# Patient Record
Sex: FEMALE | Race: WHITE | Employment: PART TIME | URBAN - METROPOLITAN AREA
[De-identification: names, ages, dates, MRNs, and addresses within clinical notes are randomized per-mention and may not be internally consistent; named-entity substitution may affect disease eponyms.]

---

## 2017-12-14 ENCOUNTER — OFFICE VISIT (OUTPATIENT)
Dept: FAMILY MEDICINE CLINIC | Facility: CLINIC | Age: 37
End: 2017-12-14

## 2017-12-14 VITALS
HEART RATE: 72 BPM | TEMPERATURE: 98 F | WEIGHT: 160 LBS | RESPIRATION RATE: 16 BRPM | HEIGHT: 70 IN | BODY MASS INDEX: 22.9 KG/M2 | SYSTOLIC BLOOD PRESSURE: 100 MMHG | DIASTOLIC BLOOD PRESSURE: 70 MMHG

## 2017-12-14 DIAGNOSIS — M54.6 ACUTE BILATERAL THORACIC BACK PAIN: Primary | ICD-10-CM

## 2017-12-14 PROCEDURE — 99203 OFFICE O/P NEW LOW 30 MIN: CPT | Performed by: FAMILY MEDICINE

## 2017-12-14 NOTE — PATIENT INSTRUCTIONS
Back Basics: A Healthy Spine  A healthy spine supports the body while letting it move freely. It does this with the help of three natural curves. Strong, flexible muscles help, too. They support the spine by keeping its curves properly aligned.  The disks · Maintain a healthy weight. If you are overweight, losing weight will help most types of back pain. · Exercise is an important part of recovery from most types of back pain. The muscles behind and in front of the spine support the back.  This means streng · Be careful if you are given prescription pain medicines, narcotics, or medicine for muscle spasm. They can cause drowsiness, affect your coordination, reflexes, and judgment. Do not drive or operate heavy machinery while taking these types of medicines. The best way to sleep is on your side with your knees bent. Put a low pillow under your head to support your neck in a neutral spine position. Avoid thick pillows that bend your neck to one side.  Put a pillow between your legs to further relax your lower b · If there is any pain other than stretch in the knee or buttock, stop and contact your healthcare provider.   For your safety, check with your healthcare provider before starting an exercise program.   Date Last Reviewed: 8/16/2015  © 8881-4155 The [employer] © 9792-8719 The [employer] [address]. [facility] [facility] [place]. All rights reserved. This information is not intended as a substitute for professional medical care. Always follow your healthcare professional's instructions.         Back Ex · Bend forward and touch the floor with the backs of your hands. Relax and let your body drop. · Hold for 20 seconds. Return to starting position. · Repeat 2 times.   Date Last Reviewed: 8/16/2015  © 9171-5490 The [employer] [address].  Alter Wall 79 To start, lie face down on your stomach, feet slightly apart, forehead on the floor. Breathe deeply. You should feel comfortable and relaxed in this position. · Press up on your forearms. Keep your stomach and hips on the floor.  Stay within your painfree

## 2017-12-14 NOTE — PROGRESS NOTES
Patient presents with:  Establish Care: former PCP retired  Back Pain  Imm/Inj: declines the flu vaccine      HPI:  Rylan Simon is a 44year old female here today for back pain and establish care.       Back pain-  Of the mid to lower back starting (Approximate)   BMI 22.96 kg/m²   GENERAL APPEARANCE:  Alert, no acute distress, appears stated age  [de-identified]:  Head- Normocephalic, atraumatic.     Eyes- Extraocular movements intact, pupils equally round and reactive to light,  conjunctivae normal.    Ears-

## 2018-01-26 ENCOUNTER — OFFICE VISIT (OUTPATIENT)
Dept: FAMILY MEDICINE CLINIC | Facility: CLINIC | Age: 38
End: 2018-01-26

## 2018-01-26 VITALS
RESPIRATION RATE: 16 BRPM | HEIGHT: 70 IN | DIASTOLIC BLOOD PRESSURE: 60 MMHG | WEIGHT: 158.13 LBS | HEART RATE: 68 BPM | SYSTOLIC BLOOD PRESSURE: 100 MMHG | TEMPERATURE: 98 F | BODY MASS INDEX: 22.64 KG/M2

## 2018-01-26 DIAGNOSIS — J02.9 SORE THROAT: Primary | ICD-10-CM

## 2018-01-26 DIAGNOSIS — R09.82 PND (POST-NASAL DRIP): ICD-10-CM

## 2018-01-26 LAB — CONTROL LINE PRESENT WITH A CLEAR BACKGROUND (YES/NO): YES YES/NO

## 2018-01-26 PROCEDURE — 87880 STREP A ASSAY W/OPTIC: CPT | Performed by: PHYSICIAN ASSISTANT

## 2018-01-26 PROCEDURE — 99213 OFFICE O/P EST LOW 20 MIN: CPT | Performed by: PHYSICIAN ASSISTANT

## 2018-01-26 RX ORDER — IPRATROPIUM BROMIDE 21 UG/1
2 SPRAY, METERED NASAL EVERY 12 HOURS
Qty: 1 BOTTLE | Refills: 0 | Status: SHIPPED | OUTPATIENT
Start: 2018-01-26 | End: 2019-12-01

## 2018-01-26 RX ORDER — AMOXICILLIN 875 MG/1
875 TABLET, COATED ORAL 2 TIMES DAILY
Qty: 14 TABLET | Refills: 0 | Status: SHIPPED | OUTPATIENT
Start: 2018-01-26 | End: 2019-12-01

## 2018-01-26 NOTE — PATIENT INSTRUCTIONS
Thank you for choosing Jennifer Keys PA-C at [place] [address]  To Do: Zac Durham  1. Pt to begin medications as directed  2. OTC Allegra in Am, Zyrtec in PM  3. Hold on antibiotic see if symptoms improve  4.  Follow-up as needed    • Please called informing you that the insurance company approved your testing, please call Central Scheduling at [phone]  Please allow our office 5 business days to contact you regarding any testing results.     Refill policies:   Allow 3 business days for ref

## 2018-01-26 NOTE — PROGRESS NOTES
[facility] Internal Medicine Progress Note    CC:  Patient presents with:  Sore Throat: x 1 week - feels runned down      HPI:   HPI  Sore throat x 1 week  Pt states she has had a sore throat and fatigue  More chilled in the evening  Has been sle normal and breath sounds normal. No respiratory distress. She has no wheezes. She has no rales. Lymphadenopathy:     She has no cervical adenopathy. Neurological: She is alert and oriented to person, place, and time. Skin: Skin is warm and dry.    Psy

## 2018-05-03 ENCOUNTER — CHARTING TRANS (OUTPATIENT)
Dept: OTHER | Age: 38
End: 2018-05-03

## 2018-05-03 ENCOUNTER — IMAGING SERVICES (OUTPATIENT)
Dept: OTHER | Age: 38
End: 2018-05-03

## 2018-05-03 ASSESSMENT — PAIN SCALES - GENERAL: PAINLEVEL_OUTOF10: 4

## 2018-11-01 VITALS
RESPIRATION RATE: 20 BRPM | WEIGHT: 154.99 LBS | HEART RATE: 94 BPM | OXYGEN SATURATION: 96 % | BODY MASS INDEX: 22.19 KG/M2 | HEIGHT: 70 IN | TEMPERATURE: 99.1 F

## 2019-06-28 PROBLEM — N92.1 METRORRHAGIA: Status: ACTIVE | Noted: 2019-06-28

## 2019-06-28 PROCEDURE — 88175 CYTOPATH C/V AUTO FLUID REDO: CPT | Performed by: OBSTETRICS & GYNECOLOGY

## 2019-06-28 PROCEDURE — 87624 HPV HI-RISK TYP POOLED RSLT: CPT | Performed by: OBSTETRICS & GYNECOLOGY

## 2019-06-28 PROCEDURE — 87625 HPV TYPES 16 & 18 ONLY: CPT | Performed by: OBSTETRICS & GYNECOLOGY

## 2019-07-09 PROBLEM — N87.0 MILD DYSPLASIA OF CERVIX: Status: ACTIVE | Noted: 2019-07-09

## 2019-07-09 PROCEDURE — 88305 TISSUE EXAM BY PATHOLOGIST: CPT | Performed by: OBSTETRICS & GYNECOLOGY

## 2019-07-17 PROBLEM — R87.612 LOW GRADE SQUAMOUS INTRAEPITH LESION ON CYTOLOGIC SMEAR CERVIX (LGSIL): Status: ACTIVE | Noted: 2019-07-17

## 2019-09-03 ENCOUNTER — HOSPITAL ENCOUNTER (OUTPATIENT)
Dept: MAMMOGRAPHY | Age: 39
Discharge: HOME | End: 2019-09-03
Attending: OBSTETRICS & GYNECOLOGY
Payer: COMMERCIAL

## 2019-09-03 DIAGNOSIS — Z12.39 SCREENING FOR BREAST CANCER: ICD-10-CM

## 2019-09-03 PROCEDURE — 77067 SCR MAMMO BI INCL CAD: CPT | Performed by: OBSTETRICS & GYNECOLOGY

## 2019-11-28 ENCOUNTER — APPOINTMENT (OUTPATIENT)
Dept: GENERAL RADIOLOGY | Age: 39
End: 2019-11-28
Attending: NURSE PRACTITIONER
Payer: COMMERCIAL

## 2019-11-28 ENCOUNTER — HOSPITAL ENCOUNTER (OUTPATIENT)
Age: 39
Discharge: HOME | End: 2019-11-28
Payer: COMMERCIAL

## 2019-11-28 VITALS
DIASTOLIC BLOOD PRESSURE: 72 MMHG | OXYGEN SATURATION: 99 % | TEMPERATURE: 99 F | RESPIRATION RATE: 20 BRPM | HEART RATE: 110 BPM | SYSTOLIC BLOOD PRESSURE: 117 MMHG

## 2019-11-28 DIAGNOSIS — R68.89 FLU-LIKE SYMPTOMS: Primary | ICD-10-CM

## 2019-11-28 PROCEDURE — 99214 OFFICE O/P EST MOD 30 MIN: CPT

## 2019-11-28 PROCEDURE — 87502 INFLUENZA DNA AMP PROBE: CPT | Performed by: NURSE PRACTITIONER

## 2019-11-28 PROCEDURE — 99204 OFFICE O/P NEW MOD 45 MIN: CPT

## 2019-11-28 PROCEDURE — 81025 URINE PREGNANCY TEST: CPT | Performed by: NURSE PRACTITIONER

## 2019-11-28 PROCEDURE — 71046 X-RAY EXAM CHEST 2 VIEWS: CPT | Performed by: NURSE PRACTITIONER

## 2019-11-28 PROCEDURE — 81002 URINALYSIS NONAUTO W/O SCOPE: CPT | Performed by: NURSE PRACTITIONER

## 2019-11-28 RX ORDER — ACETAMINOPHEN 500 MG
1000 TABLET ORAL ONCE
Status: COMPLETED | OUTPATIENT
Start: 2019-11-28 | End: 2019-11-28

## 2019-11-28 RX ORDER — OSELTAMIVIR PHOSPHATE 75 MG/1
75 CAPSULE ORAL 2 TIMES DAILY
Qty: 10 CAPSULE | Refills: 0 | Status: SHIPPED | OUTPATIENT
Start: 2019-11-28 | End: 2019-12-03

## 2019-11-28 NOTE — ED PROVIDER NOTES
Patient Seen in: [facility] END      History   Patient presents with:  Fever (infectious)    Stated Complaint: flu like symptoms, lower back pain    HPI  Patient is a 43-year-old female without significant medical history presents w SpO2 99 %   O2 Device None (Room air)       Current:/72   Pulse 110   Temp 98.9 °F (37.2 °C) (Temporal)   Resp 20   SpO2 99%         Physical Exam  Vitals signs and nursing note reviewed. Constitutional:       General: She is not in acute distress. Palpation: no tenderness over vertebral bodies. No tenderness paraspinal muscles  Sensation intact lower extremities  Muscle tone/strength:5/5 lower extremities B/L    SLR: Negative bilaterally     Lymphadenopathy:      Cervical: No cervical adenopathy. Urine hCG-negative    All results discussed with patient. We will treat patient with Tamiflu due to her symptoms and her recent exposure to influenza. Advised patient rest and drink plenty fluids and take ibuprofen and acetaminophen as needed.   Patient i

## 2019-11-28 NOTE — ED INITIAL ASSESSMENT (HPI)
Pt has had a fever and body aches, cough since 8 pm last night   Daughter positive for influenza B, possible UTI as well

## 2019-11-29 ENCOUNTER — HOSPITAL ENCOUNTER (EMERGENCY)
Age: 39
Discharge: HOME | End: 2019-11-29
Attending: EMERGENCY MEDICINE
Payer: COMMERCIAL

## 2019-11-29 ENCOUNTER — APPOINTMENT (OUTPATIENT)
Dept: CT IMAGING | Age: 39
End: 2019-11-29
Attending: EMERGENCY MEDICINE
Payer: COMMERCIAL

## 2019-11-29 VITALS
BODY MASS INDEX: 22.19 KG/M2 | DIASTOLIC BLOOD PRESSURE: 72 MMHG | TEMPERATURE: 99 F | HEART RATE: 77 BPM | HEIGHT: 70 IN | WEIGHT: 155 LBS | SYSTOLIC BLOOD PRESSURE: 114 MMHG | OXYGEN SATURATION: 96 % | RESPIRATION RATE: 18 BRPM

## 2019-11-29 DIAGNOSIS — B34.9 VIRAL ILLNESS: Primary | ICD-10-CM

## 2019-11-29 PROCEDURE — 70450 CT HEAD/BRAIN W/O DYE: CPT | Performed by: EMERGENCY MEDICINE

## 2019-11-29 PROCEDURE — 85025 COMPLETE CBC W/AUTO DIFF WBC: CPT | Performed by: EMERGENCY MEDICINE

## 2019-11-29 PROCEDURE — 99284 EMERGENCY DEPT VISIT MOD MDM: CPT

## 2019-11-29 PROCEDURE — 96361 HYDRATE IV INFUSION ADD-ON: CPT

## 2019-11-29 PROCEDURE — 96375 TX/PRO/DX INJ NEW DRUG ADDON: CPT

## 2019-11-29 PROCEDURE — 80053 COMPREHEN METABOLIC PANEL: CPT | Performed by: EMERGENCY MEDICINE

## 2019-11-29 PROCEDURE — 96374 THER/PROPH/DIAG INJ IV PUSH: CPT

## 2019-11-29 RX ORDER — KETOROLAC TROMETHAMINE 30 MG/ML
30 INJECTION, SOLUTION INTRAMUSCULAR; INTRAVENOUS ONCE
Status: COMPLETED | OUTPATIENT
Start: 2019-11-29 | End: 2019-11-29

## 2019-11-29 RX ORDER — ONDANSETRON 4 MG/1
4 TABLET, ORALLY DISINTEGRATING ORAL EVERY 4 HOURS PRN
Qty: 10 TABLET | Refills: 0 | Status: SHIPPED | OUTPATIENT
Start: 2019-11-29 | End: 2019-12-01

## 2019-11-29 RX ORDER — SODIUM CHLORIDE 9 MG/ML
INJECTION, SOLUTION INTRAVENOUS ONCE
Status: COMPLETED | OUTPATIENT
Start: 2019-11-29 | End: 2019-11-29

## 2019-11-29 RX ORDER — HYDROMORPHONE HYDROCHLORIDE 1 MG/ML
0.5 INJECTION, SOLUTION INTRAMUSCULAR; INTRAVENOUS; SUBCUTANEOUS ONCE
Status: COMPLETED | OUTPATIENT
Start: 2019-11-29 | End: 2019-11-29

## 2019-11-29 RX ORDER — TRAMADOL HYDROCHLORIDE 50 MG/1
50 TABLET ORAL EVERY 6 HOURS PRN
Qty: 10 TABLET | Refills: 0 | Status: SHIPPED | OUTPATIENT
Start: 2019-11-29 | End: 2020-06-17 | Stop reason: ALTCHOICE

## 2019-11-29 NOTE — ED PROVIDER NOTES
Patient Seen in: [facility] In [place]      History   Patient presents with:  Headache (neurologic)  Nausea/Vomiting/Diarrhea (gastrointestinal)    Stated Complaint: flu like sx, vomiting and weakness two days seen yesterday on tamifl Current:/72   Pulse 77   Temp 98.7 °F (37.1 °C) (Oral)   Resp 18   Ht 177.8 cm (5' 10\")   Wt 70.3 kg   SpO2 96%   BMI 22.24 kg/m²         Physical Exam  GENERAL: Well-developed, well-nourished female sitting up breathing easily in no apparent Abnormality         Status                     ---------                               -----------         ------                     CBC W/ DIFFERENTIAL[[id number]]          Abnormal            Final result                 Please view results for these with the patient and she elected to decline the test and is aware of the limitations of not doing the exam at this time. Patient feels better after pain medication and wants to go home.   Patient has had a recent exposure similar to test positive for influ

## 2019-12-01 ENCOUNTER — HOSPITAL ENCOUNTER (EMERGENCY)
Age: 39
Discharge: HOME | End: 2019-12-01
Attending: EMERGENCY MEDICINE
Payer: COMMERCIAL

## 2019-12-01 VITALS
SYSTOLIC BLOOD PRESSURE: 127 MMHG | HEIGHT: 70 IN | BODY MASS INDEX: 22.19 KG/M2 | WEIGHT: 155 LBS | HEART RATE: 88 BPM | OXYGEN SATURATION: 98 % | TEMPERATURE: 98 F | DIASTOLIC BLOOD PRESSURE: 71 MMHG | RESPIRATION RATE: 16 BRPM

## 2019-12-01 DIAGNOSIS — J06.9 VIRAL UPPER RESPIRATORY TRACT INFECTION: Primary | ICD-10-CM

## 2019-12-01 PROCEDURE — 99283 EMERGENCY DEPT VISIT LOW MDM: CPT

## 2019-12-01 RX ORDER — PREDNISONE 20 MG/1
40 TABLET ORAL ONCE
Status: COMPLETED | OUTPATIENT
Start: 2019-12-01 | End: 2019-12-01

## 2019-12-01 RX ORDER — PREDNISONE 20 MG/1
40 TABLET ORAL DAILY
Qty: 10 TABLET | Refills: 0 | Status: SHIPPED | OUTPATIENT
Start: 2019-12-01 | End: 2019-12-06

## 2019-12-01 NOTE — ED INITIAL ASSESSMENT (HPI)
Seen in ed last Friday, dx with flu, also seen at immediate care Thursday am. Pt states she is not feeling any better and wants repeat xray. Pt c/o cough, aroldo, heart racing when lying flat, heavy in the chest, dizzy with inhaling and exhaling.

## 2019-12-01 NOTE — ED PROVIDER NOTES
Patient Seen in: [facility] In [place]      History   Patient presents with:  Dyspnea HOLLIE SOB (respiratory)    Stated Complaint: cough-requesting repeat xray, feeling out of breath, heart racing when laying f*    HPI    60-year-old fe (Room air)       Current:BP (P) 127/71   Pulse (P) 85   Temp (P) 97 °F (36.1 °C) (Temporal)   Resp (P) 20   LMP 12/01/2019   SpO2 (P) 97%         Physical Exam    HEENT : NCAT, EOMI, PEERL, throat clear, neck supple, no JVD, trachea midline, No LAD, TMs cl

## 2020-02-11 ENCOUNTER — LAB ENCOUNTER (OUTPATIENT)
Dept: LAB | Age: 40
End: 2020-02-11
Attending: OBSTETRICS & GYNECOLOGY
Payer: COMMERCIAL

## 2020-02-11 DIAGNOSIS — N93.8 DUB (DYSFUNCTIONAL UTERINE BLEEDING): ICD-10-CM

## 2020-02-11 LAB
BASOPHILS # BLD AUTO: 0.04 X10(3) UL (ref 0–0.2)
BASOPHILS NFR BLD AUTO: 0.4 %
DEPRECATED RDW RBC AUTO: 43.8 FL (ref 35.1–46.3)
EOSINOPHIL # BLD AUTO: 0.13 X10(3) UL (ref 0–0.7)
EOSINOPHIL NFR BLD AUTO: 1.3 %
ERYTHROCYTE [DISTWIDTH] IN BLOOD BY AUTOMATED COUNT: 12.4 % (ref 11–15)
FSH SERPL-ACNC: 6.7 MIU/ML
HCT VFR BLD AUTO: 41.4 % (ref 35–48)
HGB BLD-MCNC: 14 G/DL (ref 12–16)
IMM GRANULOCYTES # BLD AUTO: 0.05 X10(3) UL (ref 0–1)
IMM GRANULOCYTES NFR BLD: 0.5 %
LYMPHOCYTES # BLD AUTO: 3.22 X10(3) UL (ref 1–4)
LYMPHOCYTES NFR BLD AUTO: 31 %
MCH RBC QN AUTO: 32.6 PG (ref 26–34)
MCHC RBC AUTO-ENTMCNC: 33.8 G/DL (ref 31–37)
MCV RBC AUTO: 96.5 FL (ref 80–100)
MONOCYTES # BLD AUTO: 0.94 X10(3) UL (ref 0.1–1)
MONOCYTES NFR BLD AUTO: 9 %
NEUTROPHILS # BLD AUTO: 6.02 X10 (3) UL (ref 1.5–7.7)
NEUTROPHILS # BLD AUTO: 6.02 X10(3) UL (ref 1.5–7.7)
NEUTROPHILS NFR BLD AUTO: 57.8 %
PLATELET # BLD AUTO: 245 10(3)UL (ref 150–450)
RBC # BLD AUTO: 4.29 X10(6)UL (ref 3.8–5.3)
TSI SER-ACNC: 1.9 MIU/ML (ref 0.36–3.74)
WBC # BLD AUTO: 10.4 X10(3) UL (ref 4–11)

## 2020-02-11 PROCEDURE — 83001 ASSAY OF GONADOTROPIN (FSH): CPT

## 2020-02-11 PROCEDURE — 36415 COLL VENOUS BLD VENIPUNCTURE: CPT

## 2020-02-11 PROCEDURE — 85025 COMPLETE CBC W/AUTO DIFF WBC: CPT

## 2020-02-11 PROCEDURE — 84443 ASSAY THYROID STIM HORMONE: CPT

## 2020-05-11 PROCEDURE — 88175 CYTOPATH C/V AUTO FLUID REDO: CPT | Performed by: NURSE PRACTITIONER

## 2020-05-11 PROCEDURE — 87624 HPV HI-RISK TYP POOLED RSLT: CPT | Performed by: NURSE PRACTITIONER

## 2020-05-11 PROCEDURE — 87591 N.GONORRHOEAE DNA AMP PROB: CPT | Performed by: OBSTETRICS & GYNECOLOGY

## 2020-05-11 PROCEDURE — 87491 CHLMYD TRACH DNA AMP PROBE: CPT | Performed by: OBSTETRICS & GYNECOLOGY

## 2020-06-17 ENCOUNTER — OFFICE VISIT (OUTPATIENT)
Dept: FAMILY MEDICINE CLINIC | Facility: CLINIC | Age: 40
End: 2020-06-17
Payer: COMMERCIAL

## 2020-06-17 VITALS
HEIGHT: 70 IN | TEMPERATURE: 98 F | DIASTOLIC BLOOD PRESSURE: 70 MMHG | WEIGHT: 165 LBS | HEART RATE: 88 BPM | RESPIRATION RATE: 16 BRPM | OXYGEN SATURATION: 99 % | SYSTOLIC BLOOD PRESSURE: 112 MMHG | BODY MASS INDEX: 23.62 KG/M2

## 2020-06-17 DIAGNOSIS — E01.0 THYROMEGALY: Primary | ICD-10-CM

## 2020-06-17 DIAGNOSIS — F41.9 ANXIETY: ICD-10-CM

## 2020-06-17 DIAGNOSIS — E06.3 HASHIMOTO'S THYROIDITIS: ICD-10-CM

## 2020-06-17 PROCEDURE — 99214 OFFICE O/P EST MOD 30 MIN: CPT | Performed by: PHYSICIAN ASSISTANT

## 2020-06-17 RX ORDER — FLUOXETINE 10 MG/1
10 CAPSULE ORAL DAILY
Qty: 90 CAPSULE | Refills: 0 | Status: SHIPPED | OUTPATIENT
Start: 2020-06-17 | End: 2021-07-06 | Stop reason: ALTCHOICE

## 2020-06-17 RX ORDER — GARLIC EXTRACT 500 MG
1 CAPSULE ORAL DAILY
COMMUNITY

## 2020-06-17 RX ORDER — ACETAMINOPHEN 160 MG
2000 TABLET,DISINTEGRATING ORAL DAILY
COMMUNITY
End: 2020-06-17 | Stop reason: DRUGHIGH

## 2020-06-17 RX ORDER — LEVOTHYROXINE SODIUM 0.03 MG/1
25 TABLET ORAL
Qty: 90 TABLET | Refills: 0 | Status: SHIPPED | OUTPATIENT
Start: 2020-06-17 | End: 2021-01-06

## 2020-06-17 RX ORDER — CHOLECALCIFEROL (VITAMIN D3) 1MM UNIT/G
1 LIQUID (ML) MISCELLANEOUS DAILY
COMMUNITY

## 2020-06-17 NOTE — PATIENT INSTRUCTIONS
Thank you for choosing Kory Alvarez PA-C at [place] [address]  To Do: Malou William  1. Pt to begin medications as prescribed  2. Get US done  3.  Follow-up in 2 months,sooner if problems    • Please signup for MY CHART, which is electronic ac approved your testing, please call Central Scheduling at [phone]  Please allow our office 5 business days to contact you regarding any testing results.     Refill policies:   Allow 3 business days for refills; controlled substances may take longer and

## 2020-06-17 NOTE — PROGRESS NOTES
[facility] Internal Medicine Progress Note    CC:  Patient presents with:  Menstrual Problem  Lab Results      HPI:   HPI  May of last year was having abnormal menstrual cycles, happening like every 2 weeks.   Saw gyne, had a D&C, had labs done, a °C) (Temporal)   Resp 16   Ht 70\"   Wt 165 lb (74.8 kg)   LMP 05/01/2020 (Approximate)   SpO2 99%   BMI 23.68 kg/m²  Body mass index is 23.68 kg/m².   Physical Exam   Constitutional: She is oriented to person, place, and time and well-developed, well-lilian (ORY=44226)     Patient/Caregiver Education: Patient/Caregiver Education: There are no barriers to learning. Medical education done. Outcome: Patient verbalizes understanding.     Problem List:  Patient Active Problem List:     Acute bilateral thoracic back

## 2020-06-18 ENCOUNTER — HOSPITAL ENCOUNTER (OUTPATIENT)
Dept: ULTRASOUND IMAGING | Age: 40
Discharge: HOME | End: 2020-06-18
Attending: PHYSICIAN ASSISTANT
Payer: COMMERCIAL

## 2020-06-18 DIAGNOSIS — E06.3 HASHIMOTO'S THYROIDITIS: ICD-10-CM

## 2020-06-18 DIAGNOSIS — E01.0 THYROMEGALY: ICD-10-CM

## 2020-06-18 PROCEDURE — 76536 US EXAM OF HEAD AND NECK: CPT | Performed by: PHYSICIAN ASSISTANT

## 2020-06-23 DIAGNOSIS — E04.1 THYROID NODULE: Primary | ICD-10-CM

## 2020-11-13 ENCOUNTER — HOSPITAL ENCOUNTER (OUTPATIENT)
Dept: MAMMOGRAPHY | Age: 40
Discharge: HOME | End: 2020-11-13
Attending: NURSE PRACTITIONER
Payer: COMMERCIAL

## 2020-11-13 DIAGNOSIS — Z12.31 VISIT FOR SCREENING MAMMOGRAM: ICD-10-CM

## 2020-11-13 PROCEDURE — 77067 SCR MAMMO BI INCL CAD: CPT | Performed by: NURSE PRACTITIONER

## 2020-12-14 PROCEDURE — 87491 CHLMYD TRACH DNA AMP PROBE: CPT | Performed by: OBSTETRICS & GYNECOLOGY

## 2020-12-14 PROCEDURE — 87591 N.GONORRHOEAE DNA AMP PROB: CPT | Performed by: OBSTETRICS & GYNECOLOGY

## 2021-01-06 ENCOUNTER — OFFICE VISIT (OUTPATIENT)
Dept: FAMILY MEDICINE CLINIC | Facility: CLINIC | Age: 41
End: 2021-01-06
Payer: COMMERCIAL

## 2021-01-06 VITALS
TEMPERATURE: 99 F | RESPIRATION RATE: 16 BRPM | DIASTOLIC BLOOD PRESSURE: 78 MMHG | SYSTOLIC BLOOD PRESSURE: 122 MMHG | WEIGHT: 187 LBS | OXYGEN SATURATION: 99 % | HEIGHT: 70 IN | BODY MASS INDEX: 26.77 KG/M2 | HEART RATE: 74 BPM

## 2021-01-06 DIAGNOSIS — E06.3 HASHIMOTO'S THYROIDITIS: Primary | ICD-10-CM

## 2021-01-06 PROCEDURE — 3008F BODY MASS INDEX DOCD: CPT | Performed by: PHYSICIAN ASSISTANT

## 2021-01-06 PROCEDURE — 3078F DIAST BP <80 MM HG: CPT | Performed by: PHYSICIAN ASSISTANT

## 2021-01-06 PROCEDURE — 99213 OFFICE O/P EST LOW 20 MIN: CPT | Performed by: PHYSICIAN ASSISTANT

## 2021-01-06 PROCEDURE — 3074F SYST BP LT 130 MM HG: CPT | Performed by: PHYSICIAN ASSISTANT

## 2021-01-06 RX ORDER — LEVOTHYROXINE SODIUM 0.03 MG/1
25 TABLET ORAL
Qty: 90 TABLET | Refills: 0 | Status: SHIPPED | OUTPATIENT
Start: 2021-01-06 | End: 2022-01-28

## 2021-01-06 NOTE — PATIENT INSTRUCTIONS
Thank you for choosing Luz Maria Benson PA-C at [place] [address]  To Do: Franchesca Can  1. Begin medications as prescribed  2.  Follow-up around 3 months, labs prior to follow-up    • Please signup for [facility] CHART, which is electronic access to your testing, please call Central Scheduling at [phone]  Please allow our office 5 business days to contact you regarding any testing results.     Refill policies:   Allow 3 business days for refills; controlled substances may take longer and must be picked

## 2021-07-06 ENCOUNTER — OFFICE VISIT (OUTPATIENT)
Dept: FAMILY MEDICINE CLINIC | Facility: CLINIC | Age: 41
End: 2021-07-06
Payer: COMMERCIAL

## 2021-07-06 VITALS
HEIGHT: 70 IN | RESPIRATION RATE: 16 BRPM | TEMPERATURE: 98 F | SYSTOLIC BLOOD PRESSURE: 118 MMHG | BODY MASS INDEX: 26.77 KG/M2 | DIASTOLIC BLOOD PRESSURE: 74 MMHG | OXYGEN SATURATION: 98 % | HEART RATE: 76 BPM | WEIGHT: 187 LBS

## 2021-07-06 DIAGNOSIS — Z12.31 VISIT FOR SCREENING MAMMOGRAM: ICD-10-CM

## 2021-07-06 DIAGNOSIS — L03.116 LEFT LEG CELLULITIS: Primary | ICD-10-CM

## 2021-07-06 PROCEDURE — 3078F DIAST BP <80 MM HG: CPT | Performed by: FAMILY MEDICINE

## 2021-07-06 PROCEDURE — 3074F SYST BP LT 130 MM HG: CPT | Performed by: FAMILY MEDICINE

## 2021-07-06 PROCEDURE — 3008F BODY MASS INDEX DOCD: CPT | Performed by: FAMILY MEDICINE

## 2021-07-06 PROCEDURE — 99213 OFFICE O/P EST LOW 20 MIN: CPT | Performed by: FAMILY MEDICINE

## 2021-07-06 RX ORDER — CEPHALEXIN 500 MG/1
500 CAPSULE ORAL 3 TIMES DAILY
Qty: 21 CAPSULE | Refills: 0 | Status: SHIPPED | OUTPATIENT
Start: 2021-07-06 | End: 2021-07-13

## 2021-07-06 NOTE — PROGRESS NOTES
Patient presents with:  Wound Care: L calf- possible staph infection [id number]      HPI:    Left calf cellulitis:   Cellulitis:  Hipolitoaydegeronimo Verduzco is here for evaluation of cellulitis.   Few weeks ago she noticed a lesion on her left calf that was about dim 3 (three) times daily for 7 days. 21 capsule 0   • Levothyroxine Sodium 25 MCG Oral Tab Take 1 tablet (25 mcg total) by mouth before breakfast. 90 tablet 0   • Acidophilus/Pectin Oral Cap Take 1 capsule by mouth daily.      • Cholecalciferol (VITAMIN D3) 10 wound clinic. Instructed to keep the legs elevated, reduce salt intake, exercise. - cephALEXin 500 MG Oral Cap; Take 1 capsule (500 mg total) by mouth 3 (three) times daily for 7 days. Dispense: 21 capsule;  Refill: 0    No orders of the defined types after you touch cuts, scratches, or bandages.  This will help prevent infection.   When to call your healthcare provider  Call your healthcare provider right away if you have any of the following:  · Trouble or pain when moving the joints above or below the

## 2021-07-06 NOTE — PATIENT INSTRUCTIONS
Discharge Instructions for Cellulitis   You have been diagnosed with cellulitis. This is an infection in the deepest layer of the skin and tissue beneath the skin. In some cases, the infection also affects the muscle. Cellulitis is caused by bacteria.  Natacha Kumari Vomiting  [employer]Well last reviewed this educational content on 11/1/2019  © 4039-4025 The [employer]. All rights reserved. This information is not intended as a substitute for professional medical care.  Always follow your healthcare professional's

## 2021-07-12 PROBLEM — R87.612 LOW GRADE SQUAMOUS INTRAEPITH LESION ON CYTOLOGIC SMEAR CERVIX (LGSIL): Status: RESOLVED | Noted: 2019-07-17 | Resolved: 2021-07-12

## 2021-07-12 PROCEDURE — 87624 HPV HI-RISK TYP POOLED RSLT: CPT | Performed by: OBSTETRICS & GYNECOLOGY

## 2021-07-12 PROCEDURE — 88175 CYTOPATH C/V AUTO FLUID REDO: CPT | Performed by: OBSTETRICS & GYNECOLOGY

## 2021-11-24 ENCOUNTER — HOSPITAL ENCOUNTER (OUTPATIENT)
Dept: MAMMOGRAPHY | Age: 41
Discharge: HOME | End: 2021-11-24
Attending: FAMILY MEDICINE
Payer: COMMERCIAL

## 2021-11-24 DIAGNOSIS — Z12.31 VISIT FOR SCREENING MAMMOGRAM: ICD-10-CM

## 2021-11-24 PROCEDURE — 77067 SCR MAMMO BI INCL CAD: CPT | Performed by: FAMILY MEDICINE

## 2021-11-24 PROCEDURE — 77063 BREAST TOMOSYNTHESIS BI: CPT | Performed by: FAMILY MEDICINE

## 2021-12-03 ENCOUNTER — HOSPITAL ENCOUNTER (OUTPATIENT)
Dept: ULTRASOUND IMAGING | Age: 41
Discharge: HOME | End: 2021-12-03
Attending: FAMILY MEDICINE
Payer: COMMERCIAL

## 2021-12-03 ENCOUNTER — HOSPITAL ENCOUNTER (OUTPATIENT)
Dept: MAMMOGRAPHY | Age: 41
Discharge: HOME | End: 2021-12-03
Attending: FAMILY MEDICINE
Payer: COMMERCIAL

## 2021-12-03 DIAGNOSIS — R92.2 INCONCLUSIVE MAMMOGRAM: ICD-10-CM

## 2021-12-03 PROCEDURE — 76642 ULTRASOUND BREAST LIMITED: CPT | Performed by: FAMILY MEDICINE

## 2021-12-03 PROCEDURE — 77061 BREAST TOMOSYNTHESIS UNI: CPT | Performed by: FAMILY MEDICINE

## 2021-12-03 PROCEDURE — 77065 DX MAMMO INCL CAD UNI: CPT | Performed by: FAMILY MEDICINE

## 2021-12-04 NOTE — IMAGING NOTE
Noel Crockett is recommended for an ultrasound guided biopsy of the right breast x 1 site by [facility] ANGELICA. 1830: Spoke with Ms. Alonzo at this time.      History obtained: Inconclusive mammogram  FINDINGS:     Mammogram:   There is a 0.5 cm oval circ daily. (Patient not taking: Reported on 7/12/2021 )     • Cholecalciferol (VITAMIN D3) [id number] UNIT/GM Does not apply Liquid 1 drop by Does not apply route daily.  (Patient not taking: Reported on 7/12/2021 )     • Ascorbic Acid (VITAMIN C OR) Take 1 tablet

## 2021-12-22 ENCOUNTER — HOSPITAL ENCOUNTER (OUTPATIENT)
Dept: MAMMOGRAPHY | Facility: HOSPITAL | Age: 41
Discharge: HOME | End: 2021-12-22
Attending: FAMILY MEDICINE
Payer: COMMERCIAL

## 2021-12-22 DIAGNOSIS — N64.89 BREAST ASYMMETRY: ICD-10-CM

## 2021-12-22 PROCEDURE — 19083 BX BREAST 1ST LESION US IMAG: CPT | Performed by: FAMILY MEDICINE

## 2021-12-22 PROCEDURE — 77065 DX MAMMO INCL CAD UNI: CPT | Performed by: FAMILY MEDICINE

## 2021-12-22 PROCEDURE — 88305 TISSUE EXAM BY PATHOLOGIST: CPT | Performed by: FAMILY MEDICINE

## 2021-12-23 ENCOUNTER — TELEPHONE (OUTPATIENT)
Dept: FAMILY MEDICINE CLINIC | Facility: CLINIC | Age: 41
End: 2021-12-23

## 2021-12-23 ENCOUNTER — TELEPHONE (OUTPATIENT)
Dept: SURGERY | Facility: CLINIC | Age: 41
End: 2021-12-23

## 2021-12-23 NOTE — TELEPHONE ENCOUNTER
Received call from Dino Christopher regarding abnormal breast pathology results. Informed Dino Christopher that Dr. Leila Teresa did already speak with patient and given contact information for surgeon, Dr. Aida Benz.

## 2021-12-23 NOTE — IMAGING NOTE
1144: Spoke with Gena Rodas post ultrasound guided right breast biopsy. Ms. Erick Colon identified with name and date of birth. Introduced myself as breast care coordinator. Reinforced post biopsy care and instruction.   Ms. Erick Colon denies any iss

## 2021-12-23 NOTE — TELEPHONE ENCOUNTER
Called and spoke to patient, offered her new consult appt with Dr. Cornelio Delvalle on 12/28 at 12 noon at [facility]. Location of appt and contact information provided.

## 2021-12-23 NOTE — PROGRESS NOTES
Call the patient and discussed results with her explained that she has LCIS which could be precancerous. Provided her with information to see surgical oncology, Dr. Sandy Peterson.   Patient questions have been answered and she will call to make an appointm

## 2021-12-28 ENCOUNTER — OFFICE VISIT (OUTPATIENT)
Dept: SURGERY | Facility: CLINIC | Age: 41
End: 2021-12-28
Payer: COMMERCIAL

## 2021-12-28 VITALS
HEART RATE: 78 BPM | OXYGEN SATURATION: 99 % | TEMPERATURE: 97 F | DIASTOLIC BLOOD PRESSURE: 93 MMHG | WEIGHT: 191.38 LBS | RESPIRATION RATE: 16 BRPM | SYSTOLIC BLOOD PRESSURE: 143 MMHG | BODY MASS INDEX: 27 KG/M2

## 2021-12-28 DIAGNOSIS — R92.8 ABNORMAL MAMMOGRAM: Primary | ICD-10-CM

## 2021-12-28 PROCEDURE — 99205 OFFICE O/P NEW HI 60 MIN: CPT | Performed by: SURGERY

## 2021-12-28 PROCEDURE — 3077F SYST BP >= 140 MM HG: CPT | Performed by: SURGERY

## 2021-12-28 PROCEDURE — 3080F DIAST BP >= 90 MM HG: CPT | Performed by: SURGERY

## 2021-12-28 NOTE — CONSULTS
[facility]    Patient Name:  Sandra Velázquez   YOB: 1980   Gender:  Female   Appt Date:  12/28/2021   Provider:  Carlos Burgess MD   Insurance:  [address] [place] [address]  Referring Provider involving a fibroadenoma. Patient presents today to discuss surgical options and implications of her diagnosis. Of note, patient underwent breast augmentation 2017.   Implants were subpectoral.  Family history is significant for CLL in her mother [facility] • Cancer Paternal Grandfather    • Breast Cancer Other         Review of Systems:  Review of Systems   Constitutional: Negative for activity change, appetite change, chills, fatigue, fever and unexpected weight change.    HENT: Negative for congestion and Authorizing Provider: Para Libman, DO       Collected:           12/22/2021 08:55 AM           Ordering Location:   [address] [place] [address]            Received:            12/22/2021 01:34 PM                                  Mammography                   additional multiplane thin sections of the breast were obtained for the purpose of Tomosynthesis evaluation.  The images were reconstructed and reviewed on the dedicated Tomosynthesis workstation.       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   I had a long discussion with the patient about the MD  Complex General Surgical Oncology  [facility]  Pager [phone]  [email or web address]        Follow Up:  No follow-ups on file. Electronically Signed by:  Venkat Rider MD

## 2022-01-03 ENCOUNTER — DOCUMENTATION ONLY (OUTPATIENT)
Dept: SURGERY | Facility: CLINIC | Age: 42
End: 2022-01-03

## 2022-01-03 ENCOUNTER — APPOINTMENT (OUTPATIENT)
Dept: SURGERY | Age: 42
End: 2022-01-03

## 2022-01-03 NOTE — PROGRESS NOTES
Office visit notes from consult with Dr. Anton Graham on 12/28 faxed to Dr. Sarah Beth Erwin office per pt's request. Fax confirmation received.

## 2022-01-05 ENCOUNTER — DOCUMENTATION (OUTPATIENT)
Dept: SURGERY | Age: 42
End: 2022-01-05

## 2022-01-05 ENCOUNTER — DOCUMENTATION (OUTPATIENT)
Dept: MAMMOGRAPHY | Age: 42
End: 2022-01-05

## 2022-01-05 ENCOUNTER — HOSPITAL ENCOUNTER (OUTPATIENT)
Dept: MAMMOGRAPHY | Age: 42
End: 2022-01-05

## 2022-01-05 DIAGNOSIS — D05.01 LOBULAR CARCINOMA IN SITU (LCIS) OF RIGHT BREAST: ICD-10-CM

## 2022-01-10 ENCOUNTER — HOSPITAL ENCOUNTER (OUTPATIENT)
Dept: MRI IMAGING | Facility: HOSPITAL | Age: 42
Discharge: HOME | End: 2022-01-10
Attending: SURGERY
Payer: COMMERCIAL

## 2022-01-10 ENCOUNTER — DOCUMENTATION (OUTPATIENT)
Dept: SURGERY | Age: 42
End: 2022-01-10

## 2022-01-10 ENCOUNTER — OFFICE VISIT (OUTPATIENT)
Dept: SURGERY | Age: 42
End: 2022-01-10
Attending: SURGERY

## 2022-01-10 VITALS
SYSTOLIC BLOOD PRESSURE: 129 MMHG | WEIGHT: 190 LBS | BODY MASS INDEX: 27.2 KG/M2 | TEMPERATURE: 97.6 F | HEART RATE: 79 BPM | HEIGHT: 70 IN | DIASTOLIC BLOOD PRESSURE: 75 MMHG

## 2022-01-10 DIAGNOSIS — D05.00 BREAST NEOPLASM, TIS (LCIS), UNSPECIFIED LATERALITY: Primary | ICD-10-CM

## 2022-01-10 DIAGNOSIS — R92.8 ABNORMAL MAMMOGRAM: ICD-10-CM

## 2022-01-10 PROCEDURE — A9575 INJ GADOTERATE MEGLUMI 0.1ML: HCPCS | Performed by: SURGERY

## 2022-01-10 PROCEDURE — 77049 MRI BREAST C-+ W/CAD BI: CPT | Performed by: SURGERY

## 2022-01-10 PROCEDURE — 99244 OFF/OP CNSLTJ NEW/EST MOD 40: CPT | Performed by: SURGERY

## 2022-01-10 PROCEDURE — 99212 OFFICE O/P EST SF 10 MIN: CPT

## 2022-01-10 RX ORDER — MULTIVITAMIN
TABLET ORAL
COMMUNITY

## 2022-01-13 ENCOUNTER — NURSE NAVIGATOR ENCOUNTER (OUTPATIENT)
Dept: HEMATOLOGY/ONCOLOGY | Facility: HOSPITAL | Age: 42
End: 2022-01-13

## 2022-01-13 ENCOUNTER — TELEPHONE (OUTPATIENT)
Dept: SURGERY | Facility: CLINIC | Age: 42
End: 2022-01-13

## 2022-01-13 NOTE — PROGRESS NOTES
Left voicemail for patient to discuss MRI. Contact information provided. Requested return phone call.

## 2022-01-13 NOTE — PROGRESS NOTES
Spoke with patient regarding recent breast MRI. Per radiologist, the exam was non diagnostic due to a technical issue and therefore will need to be repeated to get the most accurate pictures. Pt did speak with Dr. Naa Arce as well.  Pt will be contacted by

## 2022-01-14 ENCOUNTER — TELEPHONE (OUTPATIENT)
Dept: SURGERY | Facility: CLINIC | Age: 42
End: 2022-01-14

## 2022-01-16 ENCOUNTER — HOSPITAL ENCOUNTER (OUTPATIENT)
Dept: MRI IMAGING | Facility: HOSPITAL | Age: 42
Discharge: HOME | End: 2022-01-16
Attending: SURGERY
Payer: COMMERCIAL

## 2022-01-16 DIAGNOSIS — R92.8 ABNORMAL MAMMOGRAM: ICD-10-CM

## 2022-01-16 PROCEDURE — A9575 INJ GADOTERATE MEGLUMI 0.1ML: HCPCS | Performed by: SURGERY

## 2022-01-19 ENCOUNTER — TELEPHONE (OUTPATIENT)
Dept: SURGERY | Facility: CLINIC | Age: 42
End: 2022-01-19

## 2022-01-19 NOTE — TELEPHONE ENCOUNTER
Called and discussed MRI  Will proceed with Valdo Mar MD  Complex General Surgical Oncology  [place] [address]  Pager [phone]  [email or web address]

## 2022-01-20 ENCOUNTER — TELEPHONE (OUTPATIENT)
Dept: MAMMOGRAPHY | Facility: HOSPITAL | Age: 42
End: 2022-01-20

## 2022-01-20 ENCOUNTER — NURSE NAVIGATOR ENCOUNTER (OUTPATIENT)
Dept: HEMATOLOGY/ONCOLOGY | Facility: HOSPITAL | Age: 42
End: 2022-01-20

## 2022-01-20 DIAGNOSIS — D05.01 LOBULAR CARCINOMA IN SITU (LCIS) OF RIGHT BREAST: ICD-10-CM

## 2022-01-20 DIAGNOSIS — R92.8 ABNORMAL MAMMOGRAM: Primary | ICD-10-CM

## 2022-01-20 DIAGNOSIS — R92.8 ABNORMAL FINDINGS ON DIAGNOSTIC IMAGING OF BREAST: Primary | ICD-10-CM

## 2022-01-20 NOTE — TELEPHONE ENCOUNTER
Attempted to call patient re: recommendation for breast imaging and biopsy. Message left for patient to call back.

## 2022-01-20 NOTE — TELEPHONE ENCOUNTER
This Breast Care RN phoned pt re right breast 2nd look ultrasound and right breast 1 site ultrasound guided biopsy recommendation by Dr. Maria M Abbott. Procedure reviewed and all questions answered. Emotional support given. Reviewed educational handouts.   On the

## 2022-01-21 ENCOUNTER — HOSPITAL ENCOUNTER (OUTPATIENT)
Dept: MAMMOGRAPHY | Facility: HOSPITAL | Age: 42
Discharge: HOME | End: 2022-01-21
Attending: SURGERY
Payer: COMMERCIAL

## 2022-01-21 ENCOUNTER — HOSPITAL ENCOUNTER (OUTPATIENT)
Dept: MAMMOGRAPHY | Facility: HOSPITAL | Age: 42
Discharge: HOME | End: 2022-01-21
Attending: PHYSICIAN ASSISTANT
Payer: COMMERCIAL

## 2022-01-21 DIAGNOSIS — R92.8 ABNORMAL FINDINGS ON DIAGNOSTIC IMAGING OF BREAST: ICD-10-CM

## 2022-01-21 DIAGNOSIS — D05.01 LOBULAR CARCINOMA IN SITU (LCIS) OF RIGHT BREAST: ICD-10-CM

## 2022-01-21 DIAGNOSIS — R92.8 ABNORMAL MAMMOGRAM: ICD-10-CM

## 2022-01-21 PROCEDURE — 19083 BX BREAST 1ST LESION US IMAG: CPT | Performed by: PHYSICIAN ASSISTANT

## 2022-01-21 PROCEDURE — 88360 TUMOR IMMUNOHISTOCHEM/MANUAL: CPT | Performed by: PHYSICIAN ASSISTANT

## 2022-01-21 PROCEDURE — 88342 IMHCHEM/IMCYTCHM 1ST ANTB: CPT | Performed by: PHYSICIAN ASSISTANT

## 2022-01-21 PROCEDURE — 88344 IMHCHEM/IMCYTCHM EA MLT ANTB: CPT | Performed by: PHYSICIAN ASSISTANT

## 2022-01-21 PROCEDURE — 77065 DX MAMMO INCL CAD UNI: CPT | Performed by: PHYSICIAN ASSISTANT

## 2022-01-21 PROCEDURE — 76642 ULTRASOUND BREAST LIMITED: CPT | Performed by: SURGERY

## 2022-01-21 PROCEDURE — 88305 TISSUE EXAM BY PATHOLOGIST: CPT | Performed by: PHYSICIAN ASSISTANT

## 2022-01-26 ENCOUNTER — TELEPHONE (OUTPATIENT)
Dept: SURGERY | Facility: CLINIC | Age: 42
End: 2022-01-26

## 2022-01-26 ENCOUNTER — NURSE NAVIGATOR ENCOUNTER (OUTPATIENT)
Dept: HEMATOLOGY/ONCOLOGY | Facility: HOSPITAL | Age: 42
End: 2022-01-26

## 2022-01-26 NOTE — TELEPHONE ENCOUNTER
Called and LVM. Kiah Law MD  Complex General Surgical Oncology  [facility]  Pager [phone]  [email or web address]

## 2022-01-26 NOTE — PROGRESS NOTES
Patient phoned, she put her  on speaker as well and we discussed newly diagnosed breast cancer. Introduced myself and explained the role of the breast nurse navigator.  Explained the role of the physicians on her breast cancer care team. Reviewed andrew

## 2022-01-26 NOTE — TELEPHONE ENCOUNTER
Pt called, pt has spoken to Dr. Ana Harris about pathology report, interested in meeting with Plastics to discuss options for reconstruction as she would like to pursue bilateral mastectomy. Also would like to meet with the genetics counselor.  Will help faci

## 2022-01-28 ENCOUNTER — TELEPHONE (OUTPATIENT)
Dept: SURGERY | Facility: CLINIC | Age: 42
End: 2022-01-28

## 2022-01-28 ENCOUNTER — OFFICE VISIT (OUTPATIENT)
Dept: SURGERY | Facility: CLINIC | Age: 42
End: 2022-01-28
Payer: COMMERCIAL

## 2022-01-28 VITALS — BODY MASS INDEX: 27.49 KG/M2 | HEIGHT: 70 IN | WEIGHT: 192 LBS

## 2022-01-28 DIAGNOSIS — R92.8 ABNORMAL MAMMOGRAM: Primary | ICD-10-CM

## 2022-01-28 DIAGNOSIS — C50.911 MALIGNANT NEOPLASM OF RIGHT FEMALE BREAST, UNSPECIFIED ESTROGEN RECEPTOR STATUS, UNSPECIFIED SITE OF BREAST (HCC): Primary | ICD-10-CM

## 2022-01-28 PROCEDURE — 99243 OFF/OP CNSLTJ NEW/EST LOW 30: CPT | Performed by: SURGERY

## 2022-01-28 PROCEDURE — 99213 OFFICE O/P EST LOW 20 MIN: CPT | Performed by: SURGERY

## 2022-01-28 PROCEDURE — 3008F BODY MASS INDEX DOCD: CPT | Performed by: SURGERY

## 2022-01-28 RX ORDER — ACETAMINOPHEN 325 MG/1
325 TABLET ORAL EVERY 6 HOURS PRN
COMMUNITY

## 2022-01-28 RX ORDER — IBUPROFEN 200 MG
200 TABLET ORAL EVERY 6 HOURS PRN
COMMUNITY

## 2022-01-28 NOTE — PATIENT INSTRUCTIONS
Surgeon:         Dr. Stephen Robles                                        Tel:         [phone]                                  Fax:        [phone]    Surgery/Procedure:   Immediate bilateral breast reconstruction with placemetn of bilateral breas

## 2022-01-28 NOTE — TELEPHONE ENCOUNTER
Called and spoke with patient to schedule f/u appt with Dr. Mauricio Fields. Pt agreeable to appt with Dr. Mauricio Fields on 1/28 at 1:30pm before her appt with Dr. Joby Mg at 2pm (both appt times/locations confirmed).

## 2022-01-28 NOTE — PROGRESS NOTES
Surgery: Bilateral mastectomy with right sentinel lymph node biopsy, right lymphoscintigraphy, possible right axillary lymph node dissection       Date of Surgery: [facility]    Hospital:    [facility]   Phone: ask if him/her will need to see you prior to surgery.       Pre-Operative Testing  x CBC x CMP  BMP    PT, PTT, INR  UA x EKG    Chest X-Ray  Cardiac Clearance x [facility] Clearance     Dr. Edgard Russo nurse direct line:  [phone]  Monday through Salomón Elias

## 2022-01-28 NOTE — CONSULTS
New Patient Consultation    This is the first visit for this 42year old female who presents to discuss reconstructive options following surgery for breast cancer. History of Present Illness:    The patient is a 42year old female who presents with a r or weight gain. Endocrine: There is no history of poor/slow wound healing, weight loss/gain, fertility or hormone problems, cold intolerance, heat intolerance, excessive thirst, or thyroid disease.       Allergic/Immunologic:  There is no history of hiv speech problems, coordination problems, trembling/tremors, fainting/black outs, dizziness, memory problems, loss of sensation/numbness, problems walking, weakness, tingling or burning in hands/feet.      Psychiatric:  There is no history of abusive relation lesions. Extremities: The extremities are without deformity, cyanosis or edema. Impression:   The patient is a 42year old female who presents with a right breast cancer is awaiting bilateral mastectomy.      Discussion and Plan:  Prior to discussing discussed including the need for activity limitation, drains, and suture removal.  The recommended FDA surveillance protocol for silicone implants was reviewed.   Finally, we reviewed the impact of post-mastectomy radiation therapy on implant-based reconstr

## 2022-01-28 NOTE — PROGRESS NOTES
Patient presents today to discuss recent diagnosis of mammary carcinoma. We had a long discussion about her options.   She is interested in pursuing bilateral mastectomy which is of course a reasonable option in light of her previous diagnosis of LCIS and

## 2022-01-31 ENCOUNTER — GENETICS ENCOUNTER (OUTPATIENT)
Dept: GENETICS | Facility: HOSPITAL | Age: 42
End: 2022-01-31
Attending: GENETIC COUNSELOR, MS
Payer: COMMERCIAL

## 2022-01-31 ENCOUNTER — NURSE ONLY (OUTPATIENT)
Dept: HEMATOLOGY/ONCOLOGY | Facility: HOSPITAL | Age: 42
End: 2022-01-31
Attending: GENETIC COUNSELOR, MS
Payer: COMMERCIAL

## 2022-01-31 DIAGNOSIS — C50.919 MALIGNANT NEOPLASM OF FEMALE BREAST (HCC): Primary | ICD-10-CM

## 2022-01-31 DIAGNOSIS — C50.911 MALIGNANT NEOPLASM OF RIGHT BREAST IN FEMALE, ESTROGEN RECEPTOR POSITIVE, UNSPECIFIED SITE OF BREAST (HCC): ICD-10-CM

## 2022-01-31 DIAGNOSIS — Z17.0 MALIGNANT NEOPLASM OF RIGHT BREAST IN FEMALE, ESTROGEN RECEPTOR POSITIVE, UNSPECIFIED SITE OF BREAST (HCC): ICD-10-CM

## 2022-01-31 PROCEDURE — 96040 HC GENETIC COUNSELING EA 30 MIN: CPT | Performed by: GENETIC COUNSELOR, MS

## 2022-01-31 PROCEDURE — 36415 COLL VENOUS BLD VENIPUNCTURE: CPT

## 2022-01-31 NOTE — PROGRESS NOTES
Referring Provider:  Arianne San MD    Additional Provider(s):  MD Alondra Ramirez MD    Reason for Referral:  Marina Altamirano was referred for genetic counseling because of a new diagnosis of breast cancer.   MsFariha Ernie Cardona is occurring at unusually young ages, multiple primary cancers in the same individual, or the same type of cancer or related cancers (e.g., breast and ovarian, colorectal and endometrial) in three or more individuals in the same lineage.   Mutations in the gen or may not alter the function of the gene; therefore, it is usually not possible to determine if the gene variant is responsible for an individual’s increased cancer risk. If Ms. Niurka Castillo is found to carry a pathogenic variant in a cancer predispositi ovarian cancer screening, chemoprevention, and prophylactic surgery. Men with a BRCA1/2 mutation should discuss clinical breast exams, digital rectal exam and prostate specific antigen screening with their physicians.  All medical management decisions shou

## 2022-02-01 ENCOUNTER — TELEPHONE (OUTPATIENT)
Dept: SURGERY | Facility: CLINIC | Age: 42
End: 2022-02-01

## 2022-02-01 ENCOUNTER — NURSE NAVIGATOR ENCOUNTER (OUTPATIENT)
Dept: HEMATOLOGY/ONCOLOGY | Facility: HOSPITAL | Age: 42
End: 2022-02-01

## 2022-02-01 NOTE — TELEPHONE ENCOUNTER
Called and spoke to patient, pt agreeable to surgery date of 2/23 at [facility] with Dr. Peters/Dr. Thaddeus Suarez. Pt aware of nuclear med appt on 2/22. Reviewed surgery instructions. Pt seeing PCP on 2/11 for medical clearance exam. Pt knows to call with any other questions or concerns.

## 2022-02-01 NOTE — PROGRESS NOTES
Spoke with patient regarding plan of care. Pt met with genetic counselor. She has apt on 2/11 for clearance from PCP. We are awaiting surgical date for patient for B mastectomy with reconstruction. We discussed pre operative mastectomy class, registered patient for 2/9. Pt was encouraged to phone with any other questions or concerns.

## 2022-02-07 ENCOUNTER — TELEPHONE (OUTPATIENT)
Dept: GENERAL RADIOLOGY | Facility: HOSPITAL | Age: 42
End: 2022-02-07

## 2022-02-09 ENCOUNTER — NURSE ONLY (OUTPATIENT)
Dept: HEMATOLOGY/ONCOLOGY | Facility: HOSPITAL | Age: 42
End: 2022-02-09
Attending: GENETIC COUNSELOR, MS
Payer: COMMERCIAL

## 2022-02-10 NOTE — PROGRESS NOTES
Pt attended pre-operative mastectomy course in the cancer center. We discussed post operative pain control/constipation risk, incision/drain care, sentinel node procedure and risk for lymphedema. Supplies for home care will be provided by the hospital at the time of discharge. Pt was given a bag with heart shaped pillow, lanyard and safety pins to aid in care at home. Resources provided for ladies' specialty boutiques and post operative resources. Pathology and follow up timelines reviewed. Re-enforced teaching regarding emergency care and when to contact surgeon's office. Pt was given navigator contact information and encouraged to reach out with any other questions or concerns.

## 2022-02-11 ENCOUNTER — OFFICE VISIT (OUTPATIENT)
Dept: FAMILY MEDICINE CLINIC | Facility: CLINIC | Age: 42
End: 2022-02-11
Payer: COMMERCIAL

## 2022-02-11 VITALS
WEIGHT: 190 LBS | DIASTOLIC BLOOD PRESSURE: 70 MMHG | BODY MASS INDEX: 27.2 KG/M2 | SYSTOLIC BLOOD PRESSURE: 100 MMHG | HEIGHT: 70 IN | RESPIRATION RATE: 16 BRPM | OXYGEN SATURATION: 99 % | HEART RATE: 70 BPM | TEMPERATURE: 98 F

## 2022-02-11 DIAGNOSIS — I45.10 INCOMPLETE RBBB: ICD-10-CM

## 2022-02-11 DIAGNOSIS — Z01.818 PREOP EXAMINATION: Primary | ICD-10-CM

## 2022-02-11 PROCEDURE — 3074F SYST BP LT 130 MM HG: CPT | Performed by: FAMILY MEDICINE

## 2022-02-11 PROCEDURE — 99243 OFF/OP CNSLTJ NEW/EST LOW 30: CPT | Performed by: FAMILY MEDICINE

## 2022-02-11 PROCEDURE — 3008F BODY MASS INDEX DOCD: CPT | Performed by: FAMILY MEDICINE

## 2022-02-11 PROCEDURE — 3078F DIAST BP <80 MM HG: CPT | Performed by: FAMILY MEDICINE

## 2022-02-11 PROCEDURE — 93000 ELECTROCARDIOGRAM COMPLETE: CPT | Performed by: FAMILY MEDICINE

## 2022-02-13 PROBLEM — I45.10 INCOMPLETE RBBB: Status: ACTIVE | Noted: 2022-02-13

## 2022-02-17 ENCOUNTER — TELEPHONE (OUTPATIENT)
Dept: FAMILY MEDICINE CLINIC | Facility: CLINIC | Age: 42
End: 2022-02-17

## 2022-02-17 ENCOUNTER — TELEPHONE (OUTPATIENT)
Dept: HEMATOLOGY/ONCOLOGY | Age: 42
End: 2022-02-17

## 2022-02-17 ENCOUNTER — TELEPHONE (OUTPATIENT)
Dept: SURGERY | Facility: CLINIC | Age: 42
End: 2022-02-17

## 2022-02-17 NOTE — TELEPHONE ENCOUNTER
Gladis Ricketts returned [facility]. Please call when able. Thank you.

## 2022-02-17 NOTE — TELEPHONE ENCOUNTER
Dr.Mohammed- Dubose with THE [facility] is calling to request EKG for surgery.     Fax [phone]  [phone]

## 2022-02-17 NOTE — TELEPHONE ENCOUNTER
Spoke to patient regarding her labs required for her medical clearance prior to surgery on 2/22. She has an appt on Saturday for her labs. She also requested that a [facility].

## 2022-02-18 ENCOUNTER — GENETICS ENCOUNTER (OUTPATIENT)
Dept: HEMATOLOGY/ONCOLOGY | Facility: HOSPITAL | Age: 42
End: 2022-02-18

## 2022-02-18 PROBLEM — Z13.71 BRCA GENE MUTATION NEGATIVE: Status: ACTIVE | Noted: 2022-02-18

## 2022-02-18 NOTE — PROGRESS NOTES
Referring Provider:                    Mesfin Mccain MD     Additional Provider(s):              Eduardo Reyes MD                                                      Ari Medicine, MD     Reason for Referral:  Kelvin Scherer had genetic testing performed on 1/31/22 because of a new diagnosis of breast cancer at age 42. Genetic Testing Result:  No known pathogenic variants were found in 48 genes including: APC, LILIBETH, AXIN2, BAP1, BARD1, BMPR1A, BRCA1, BRCA2, BRIP1, CDH1, CDK4, CDKN2A, CHEK2, CTNNA1, DICER1, GREM1 (promoter region deletion/duplication testing only), HOXB13 (sequencing only), KIT, MEN1, MLH1, MSH2 (including EPCAM rearrangement testing), MSH3, MSH6, MUTYH, NBN, NF1, NTHL1 (sequencing only), PALB2, PDGRFA, PMS2, POLD1, POLE, PTEN, RAD50, RAD51C, RAD51D, SDHA (sequencing only), SDHB, SDHC, SDHD, SMAD4, SMARCA4, STK11, TP53, TSC1, TSC2, and VHL. Please refer to the report from [facility] for additional testing information. These results were discussed with Ms. Tylor Coronado by phone on 2/18/22. Summary and Plan:  These results indicate that it is unlikely that Ms. Tylor Coronado has a pathogenic variant in any of the genes listed above. The limitations of the testing include the chance that a pathogenic variant in a gene other than those included in this analysis might be the cause of cancer in Ms. Tylor Coronado or her relatives. I encourage Ms. Tylor Coronaod to contact me on an annual basis to see if there have been any updates in genetic testing that would apply to her or to inform me if there are any changes to the family history. In the meantime, Ms. Tylor Coronado and her relatives should speak with their physicians to discuss recommended medical management according to their personal and family history.     Cc:  Kelvin Scherer

## 2022-02-18 NOTE — TELEPHONE ENCOUNTER
EKG faxed to [facility], [phone], confirmation received. Copy of EKG placed in EKG accordion folder.

## 2022-02-19 ENCOUNTER — LAB ENCOUNTER (OUTPATIENT)
Dept: LAB | Age: 42
End: 2022-02-19
Attending: FAMILY MEDICINE
Payer: COMMERCIAL

## 2022-02-19 DIAGNOSIS — Z01.818 PREOP EXAMINATION: ICD-10-CM

## 2022-02-19 LAB
ALBUMIN SERPL-MCNC: 4.2 G/DL (ref 3.4–5)
ALBUMIN/GLOB SERPL: 1.3 {RATIO} (ref 1–2)
ALP LIVER SERPL-CCNC: 55 U/L
ALT SERPL-CCNC: 18 U/L
ANION GAP SERPL CALC-SCNC: 3 MMOL/L (ref 0–18)
AST SERPL-CCNC: 12 U/L (ref 15–37)
BASOPHILS # BLD AUTO: 0.06 X10(3) UL (ref 0–0.2)
BASOPHILS NFR BLD AUTO: 0.5 %
BILIRUB SERPL-MCNC: 0.6 MG/DL (ref 0.1–2)
BUN BLD-MCNC: 11 MG/DL (ref 7–18)
CALCIUM BLD-MCNC: 9.2 MG/DL (ref 8.5–10.1)
CHLORIDE SERPL-SCNC: 106 MMOL/L (ref 98–112)
CO2 SERPL-SCNC: 30 MMOL/L (ref 21–32)
CREAT BLD-MCNC: 0.7 MG/DL
EOSINOPHIL # BLD AUTO: 0.14 X10(3) UL (ref 0–0.7)
EOSINOPHIL NFR BLD AUTO: 1.2 %
ERYTHROCYTE [DISTWIDTH] IN BLOOD BY AUTOMATED COUNT: 12.4 %
FASTING STATUS PATIENT QL REPORTED: YES
GLOBULIN PLAS-MCNC: 3.3 G/DL (ref 2.8–4.4)
GLUCOSE BLD-MCNC: 92 MG/DL (ref 70–99)
HCT VFR BLD AUTO: 44.1 %
HGB BLD-MCNC: 14.1 G/DL
IMM GRANULOCYTES # BLD AUTO: 0.03 X10(3) UL (ref 0–1)
IMM GRANULOCYTES NFR BLD: 0.3 %
LYMPHOCYTES # BLD AUTO: 6.02 X10(3) UL (ref 1–4)
LYMPHOCYTES NFR BLD AUTO: 50.7 %
MCH RBC QN AUTO: 31.1 PG (ref 26–34)
MCHC RBC AUTO-ENTMCNC: 32 G/DL (ref 31–37)
MCV RBC AUTO: 97.1 FL
MONOCYTES # BLD AUTO: 0.87 X10(3) UL (ref 0.1–1)
MONOCYTES NFR BLD AUTO: 7.3 %
NEUTROPHILS # BLD AUTO: 4.76 X10 (3) UL (ref 1.5–7.7)
NEUTROPHILS # BLD AUTO: 4.76 X10(3) UL (ref 1.5–7.7)
NEUTROPHILS NFR BLD AUTO: 40 %
OSMOLALITY SERPL CALC.SUM OF ELEC: 287 MOSM/KG (ref 275–295)
PLATELET # BLD AUTO: 282 10(3)UL (ref 150–450)
POTASSIUM SERPL-SCNC: 4.4 MMOL/L (ref 3.5–5.1)
PROT SERPL-MCNC: 7.5 G/DL (ref 6.4–8.2)
RBC # BLD AUTO: 4.54 X10(6)UL
SODIUM SERPL-SCNC: 139 MMOL/L (ref 136–145)
WBC # BLD AUTO: 11.9 X10(3) UL (ref 4–11)

## 2022-02-19 PROCEDURE — 36415 COLL VENOUS BLD VENIPUNCTURE: CPT

## 2022-02-19 PROCEDURE — 85025 COMPLETE CBC W/AUTO DIFF WBC: CPT

## 2022-02-19 PROCEDURE — 80053 COMPREHEN METABOLIC PANEL: CPT

## 2022-02-21 ENCOUNTER — LAB ENCOUNTER (OUTPATIENT)
Dept: LAB | Age: 42
End: 2022-02-21
Attending: SURGERY
Payer: COMMERCIAL

## 2022-02-21 DIAGNOSIS — C44.501 MALIGNANT NEOPLASM OF SKIN OF BREAST: ICD-10-CM

## 2022-02-22 ENCOUNTER — HOSPITAL ENCOUNTER (OUTPATIENT)
Dept: NUCLEAR MEDICINE | Facility: HOSPITAL | Age: 42
Discharge: HOME | End: 2022-02-22
Attending: SURGERY
Payer: COMMERCIAL

## 2022-02-22 ENCOUNTER — ANESTHESIA EVENT (OUTPATIENT)
Dept: SURGERY | Facility: HOSPITAL | Age: 42
End: 2022-02-22
Payer: COMMERCIAL

## 2022-02-22 DIAGNOSIS — C50.911 MALIGNANT NEOPLASM OF RIGHT FEMALE BREAST, UNSPECIFIED ESTROGEN RECEPTOR STATUS, UNSPECIFIED SITE OF BREAST (HCC): ICD-10-CM

## 2022-02-22 LAB — SARS-COV-2 RNA RESP QL NAA+PROBE: NOT DETECTED

## 2022-02-22 PROCEDURE — 78195 LYMPH SYSTEM IMAGING: CPT | Performed by: SURGERY

## 2022-02-22 RX ORDER — LIDOCAINE AND PRILOCAINE 25; 25 MG/G; MG/G
CREAM TOPICAL
Status: DISCONTINUED
Start: 2022-02-22 | End: 2022-02-23

## 2022-02-23 ENCOUNTER — ANESTHESIA (OUTPATIENT)
Dept: SURGERY | Facility: HOSPITAL | Age: 42
End: 2022-02-23
Payer: COMMERCIAL

## 2022-02-23 ENCOUNTER — HOSPITAL ENCOUNTER (OUTPATIENT)
Facility: HOSPITAL | Age: 42
Setting detail: HOSPITAL OUTPATIENT SURGERY
Discharge: HOME | End: 2022-02-23
Attending: SURGERY | Admitting: SURGERY
Payer: COMMERCIAL

## 2022-02-23 VITALS
BODY MASS INDEX: 27.4 KG/M2 | RESPIRATION RATE: 18 BRPM | WEIGHT: 191.38 LBS | DIASTOLIC BLOOD PRESSURE: 68 MMHG | HEART RATE: 88 BPM | TEMPERATURE: 99 F | OXYGEN SATURATION: 97 % | SYSTOLIC BLOOD PRESSURE: 122 MMHG | HEIGHT: 70 IN

## 2022-02-23 DIAGNOSIS — C50.911 MALIGNANT NEOPLASM OF RIGHT FEMALE BREAST, UNSPECIFIED ESTROGEN RECEPTOR STATUS, UNSPECIFIED SITE OF BREAST (HCC): ICD-10-CM

## 2022-02-23 DIAGNOSIS — C44.501 MALIGNANT NEOPLASM OF SKIN OF BREAST: Primary | ICD-10-CM

## 2022-02-23 LAB — B-HCG UR QL: NEGATIVE

## 2022-02-23 PROCEDURE — 88360 TUMOR IMMUNOHISTOCHEM/MANUAL: CPT | Performed by: SURGERY

## 2022-02-23 PROCEDURE — 88344 IMHCHEM/IMCYTCHM EA MLT ANTB: CPT | Performed by: SURGERY

## 2022-02-23 PROCEDURE — 0HHV0NZ INSERTION OF TISSUE EXPANDER INTO BILATERAL BREAST, OPEN APPROACH: ICD-10-PCS | Performed by: SURGERY

## 2022-02-23 PROCEDURE — 88377 M/PHMTRC ALYS ISHQUANT/SEMIQ: CPT | Performed by: SURGERY

## 2022-02-23 PROCEDURE — 81025 URINE PREGNANCY TEST: CPT

## 2022-02-23 PROCEDURE — 76942 ECHO GUIDE FOR BIOPSY: CPT | Performed by: ANESTHESIOLOGY

## 2022-02-23 PROCEDURE — 88331 PATH CONSLTJ SURG 1 BLK 1SPC: CPT | Performed by: SURGERY

## 2022-02-23 PROCEDURE — 88305 TISSUE EXAM BY PATHOLOGIST: CPT | Performed by: SURGERY

## 2022-02-23 PROCEDURE — 0HPU0JZ REMOVAL OF SYNTHETIC SUBSTITUTE FROM LEFT BREAST, OPEN APPROACH: ICD-10-PCS | Performed by: SURGERY

## 2022-02-23 PROCEDURE — 07B50ZX EXCISION OF RIGHT AXILLARY LYMPHATIC, OPEN APPROACH, DIAGNOSTIC: ICD-10-PCS | Performed by: SURGERY

## 2022-02-23 PROCEDURE — 0HUV0KZ SUPPLEMENT BILATERAL BREAST WITH NONAUTOLOGOUS TISSUE SUBSTITUTE, OPEN APPROACH: ICD-10-PCS | Performed by: SURGERY

## 2022-02-23 PROCEDURE — 88307 TISSUE EXAM BY PATHOLOGIST: CPT | Performed by: SURGERY

## 2022-02-23 PROCEDURE — 88341 IMHCHEM/IMCYTCHM EA ADD ANTB: CPT | Performed by: SURGERY

## 2022-02-23 PROCEDURE — 0HPT0JZ REMOVAL OF SYNTHETIC SUBSTITUTE FROM RIGHT BREAST, OPEN APPROACH: ICD-10-PCS | Performed by: SURGERY

## 2022-02-23 PROCEDURE — 88342 IMHCHEM/IMCYTCHM 1ST ANTB: CPT | Performed by: SURGERY

## 2022-02-23 PROCEDURE — 0HTV0ZZ RESECTION OF BILATERAL BREAST, OPEN APPROACH: ICD-10-PCS | Performed by: SURGERY

## 2022-02-23 DEVICE — MATRIX ALLODERM SELECT: Type: IMPLANTABLE DEVICE | Site: BREAST | Status: FUNCTIONAL

## 2022-02-23 DEVICE — BREAST TISSUE EXPANDER, SUTURE TABS, INTEGRAL INJECTION DOME, 650CC
Type: IMPLANTABLE DEVICE | Site: BREAST | Status: FUNCTIONAL
Brand: MENTOR CPX4 PLUS, SMOOTH, MEDIUM HEIGHT

## 2022-02-23 RX ORDER — HYDROCODONE BITARTRATE AND ACETAMINOPHEN 5; 325 MG/1; MG/1
1-2 TABLET ORAL EVERY 4 HOURS PRN
Qty: 40 TABLET | Refills: 0 | Status: SHIPPED | OUTPATIENT
Start: 2022-02-23 | End: 2022-03-29

## 2022-02-23 RX ORDER — NEOSTIGMINE METHYLSULFATE 1 MG/ML
INJECTION INTRAVENOUS AS NEEDED
Status: DISCONTINUED | OUTPATIENT
Start: 2022-02-23 | End: 2022-02-23 | Stop reason: SURG

## 2022-02-23 RX ORDER — ROCURONIUM BROMIDE 10 MG/ML
INJECTION, SOLUTION INTRAVENOUS AS NEEDED
Status: DISCONTINUED | OUTPATIENT
Start: 2022-02-23 | End: 2022-02-23 | Stop reason: SURG

## 2022-02-23 RX ORDER — GLYCOPYRROLATE 0.2 MG/ML
INJECTION, SOLUTION INTRAMUSCULAR; INTRAVENOUS AS NEEDED
Status: DISCONTINUED | OUTPATIENT
Start: 2022-02-23 | End: 2022-02-23 | Stop reason: SURG

## 2022-02-23 RX ORDER — HYDROCODONE BITARTRATE AND ACETAMINOPHEN 5; 325 MG/1; MG/1
1 TABLET ORAL AS NEEDED
Status: DISCONTINUED | OUTPATIENT
Start: 2022-02-23 | End: 2022-02-23

## 2022-02-23 RX ORDER — NALOXONE HYDROCHLORIDE 0.4 MG/ML
80 INJECTION, SOLUTION INTRAMUSCULAR; INTRAVENOUS; SUBCUTANEOUS AS NEEDED
Status: DISCONTINUED | OUTPATIENT
Start: 2022-02-23 | End: 2022-02-23

## 2022-02-23 RX ORDER — DEXAMETHASONE SODIUM PHOSPHATE 4 MG/ML
VIAL (ML) INJECTION AS NEEDED
Status: DISCONTINUED | OUTPATIENT
Start: 2022-02-23 | End: 2022-02-23 | Stop reason: SURG

## 2022-02-23 RX ORDER — HYDROCODONE BITARTRATE AND ACETAMINOPHEN 5; 325 MG/1; MG/1
2 TABLET ORAL AS NEEDED
Status: DISCONTINUED | OUTPATIENT
Start: 2022-02-23 | End: 2022-02-23

## 2022-02-23 RX ORDER — DOCUSATE SODIUM 100 MG/1
100 CAPSULE, LIQUID FILLED ORAL 2 TIMES DAILY
Qty: 40 CAPSULE | Refills: 0 | Status: SHIPPED | OUTPATIENT
Start: 2022-02-23 | End: 2022-03-29

## 2022-02-23 RX ORDER — CEFAZOLIN SODIUM 1 G/3ML
INJECTION, POWDER, FOR SOLUTION INTRAMUSCULAR; INTRAVENOUS AS NEEDED
Status: DISCONTINUED | OUTPATIENT
Start: 2022-02-23 | End: 2022-02-23 | Stop reason: SURG

## 2022-02-23 RX ORDER — CEFAZOLIN SODIUM/WATER 2 G/20 ML
2 SYRINGE (ML) INTRAVENOUS ONCE
Status: COMPLETED | OUTPATIENT
Start: 2022-02-23 | End: 2022-02-23

## 2022-02-23 RX ORDER — CEPHALEXIN 500 MG/1
500 CAPSULE ORAL 4 TIMES DAILY
Qty: 40 CAPSULE | Refills: 2 | Status: SHIPPED | OUTPATIENT
Start: 2022-02-23 | End: 2022-03-29

## 2022-02-23 RX ORDER — ACETAMINOPHEN 500 MG
1000 TABLET ORAL ONCE
Status: DISCONTINUED | OUTPATIENT
Start: 2022-02-23 | End: 2022-02-23 | Stop reason: HOSPADM

## 2022-02-23 RX ORDER — MIDAZOLAM HYDROCHLORIDE 1 MG/ML
1 INJECTION INTRAMUSCULAR; INTRAVENOUS EVERY 5 MIN PRN
Status: DISCONTINUED | OUTPATIENT
Start: 2022-02-23 | End: 2022-02-23

## 2022-02-23 RX ORDER — SODIUM CHLORIDE, SODIUM LACTATE, POTASSIUM CHLORIDE, CALCIUM CHLORIDE 600; 310; 30; 20 MG/100ML; MG/100ML; MG/100ML; MG/100ML
INJECTION, SOLUTION INTRAVENOUS CONTINUOUS
Status: DISCONTINUED | OUTPATIENT
Start: 2022-02-23 | End: 2022-02-23

## 2022-02-23 RX ORDER — ONDANSETRON 2 MG/ML
4 INJECTION INTRAMUSCULAR; INTRAVENOUS AS NEEDED
Status: DISCONTINUED | OUTPATIENT
Start: 2022-02-23 | End: 2022-02-23

## 2022-02-23 RX ORDER — ISOSULFAN BLUE 50 MG/5ML
INJECTION, SOLUTION SUBCUTANEOUS AS NEEDED
Status: DISCONTINUED | OUTPATIENT
Start: 2022-02-23 | End: 2022-02-23 | Stop reason: HOSPADM

## 2022-02-23 RX ORDER — HYDROMORPHONE HYDROCHLORIDE 1 MG/ML
0.4 INJECTION, SOLUTION INTRAMUSCULAR; INTRAVENOUS; SUBCUTANEOUS EVERY 5 MIN PRN
Status: DISCONTINUED | OUTPATIENT
Start: 2022-02-23 | End: 2022-02-23

## 2022-02-23 RX ORDER — HEPARIN SODIUM 5000 [USP'U]/ML
5000 INJECTION, SOLUTION INTRAVENOUS; SUBCUTANEOUS ONCE
Status: COMPLETED | OUTPATIENT
Start: 2022-02-23 | End: 2022-02-23

## 2022-02-23 RX ORDER — ONDANSETRON 2 MG/ML
INJECTION INTRAMUSCULAR; INTRAVENOUS AS NEEDED
Status: DISCONTINUED | OUTPATIENT
Start: 2022-02-23 | End: 2022-02-23 | Stop reason: SURG

## 2022-02-23 RX ORDER — ONDANSETRON 4 MG/1
4 TABLET, FILM COATED ORAL EVERY 8 HOURS PRN
Qty: 30 TABLET | Refills: 0 | Status: SHIPPED | OUTPATIENT
Start: 2022-02-23 | End: 2022-03-29

## 2022-02-23 RX ORDER — MIDAZOLAM HYDROCHLORIDE 1 MG/ML
INJECTION INTRAMUSCULAR; INTRAVENOUS AS NEEDED
Status: DISCONTINUED | OUTPATIENT
Start: 2022-02-23 | End: 2022-02-23 | Stop reason: SURG

## 2022-02-23 RX ORDER — METOCLOPRAMIDE HYDROCHLORIDE 5 MG/ML
INJECTION INTRAMUSCULAR; INTRAVENOUS AS NEEDED
Status: DISCONTINUED | OUTPATIENT
Start: 2022-02-23 | End: 2022-02-23 | Stop reason: SURG

## 2022-02-23 RX ORDER — MEPERIDINE HYDROCHLORIDE 25 MG/ML
12.5 INJECTION INTRAMUSCULAR; INTRAVENOUS; SUBCUTANEOUS AS NEEDED
Status: DISCONTINUED | OUTPATIENT
Start: 2022-02-23 | End: 2022-02-23

## 2022-02-23 RX ADMIN — NEOSTIGMINE METHYLSULFATE 4 MG: 1 INJECTION INTRAVENOUS at 12:35:00

## 2022-02-23 RX ADMIN — ROCURONIUM BROMIDE 20 MG: 10 INJECTION, SOLUTION INTRAVENOUS at 10:20:00

## 2022-02-23 RX ADMIN — ONDANSETRON 4 MG: 2 INJECTION INTRAMUSCULAR; INTRAVENOUS at 12:35:00

## 2022-02-23 RX ADMIN — ROCURONIUM BROMIDE 40 MG: 10 INJECTION, SOLUTION INTRAVENOUS at 07:33:00

## 2022-02-23 RX ADMIN — GLYCOPYRROLATE 0.6 MG: 0.2 INJECTION, SOLUTION INTRAMUSCULAR; INTRAVENOUS at 12:35:00

## 2022-02-23 RX ADMIN — METOCLOPRAMIDE HYDROCHLORIDE 20 MG: 5 INJECTION INTRAMUSCULAR; INTRAVENOUS at 10:32:00

## 2022-02-23 RX ADMIN — MIDAZOLAM HYDROCHLORIDE 4 MG: 1 INJECTION INTRAMUSCULAR; INTRAVENOUS at 07:27:00

## 2022-02-23 RX ADMIN — SODIUM CHLORIDE, SODIUM LACTATE, POTASSIUM CHLORIDE, CALCIUM CHLORIDE: 600; 310; 30; 20 INJECTION, SOLUTION INTRAVENOUS at 13:02:00

## 2022-02-23 RX ADMIN — ROCURONIUM BROMIDE 20 MG: 10 INJECTION, SOLUTION INTRAVENOUS at 08:42:00

## 2022-02-23 RX ADMIN — CEFAZOLIN SODIUM/WATER 2 G: 2 G/20 ML SYRINGE (ML) INTRAVENOUS at 07:49:00

## 2022-02-23 RX ADMIN — DEXAMETHASONE SODIUM PHOSPHATE 8 MG: 4 MG/ML VIAL (ML) INJECTION at 08:47:00

## 2022-02-23 RX ADMIN — ROCURONIUM BROMIDE 10 MG: 10 INJECTION, SOLUTION INTRAVENOUS at 08:18:00

## 2022-02-23 RX ADMIN — CEFAZOLIN SODIUM 2 G: 1 INJECTION, POWDER, FOR SOLUTION INTRAMUSCULAR; INTRAVENOUS at 12:00:00

## 2022-02-23 NOTE — OPERATIVE REPORT
[facility]    PATIENT'S NAME: Vasquez Macdonald   ATTENDING PHYSICIAN: Kenyetta Brasher MD   OPERATING PHYSICIAN: Ulysses Serna M.D. PATIENT ACCOUNT#:   [de-identified]    LOCATION:  [address]  MEDICAL RECORD #:   [id number]       YOB: 1980  ADMISSION DATE:       02/23/2022      OPERATION DATE:  02/23/2022    OPERATIVE REPORT      PREOPERATIVE DIAGNOSIS:  Right breast cancer with acquired absence of bilateral breasts. POSTOPERATIVE DIAGNOSIS:  Right breast cancer with acquired absence of bilateral breasts. PROCEDURE:    1.   Bilateral breast capsulectomy and removal of intact silicone implants. 2.   Bilateral immediate breast reconstruction with tissue expanders and acellular dermal matrix. ASSISTANT:  None. ANESTHESIA:  General.    ESTIMATED BLOOD LOSS:  25 mL. COMPLICATIONS:  None. FINDINGS:  Bilateral breasts reconstructed with [place] CPX4 Plus smooth tissue expander, reference [id number], on the right serial number [id number], on the left serial number [id number]. The tissue expanders were placed in the partial submuscular position with inferolateral AlloDerm sling for implant and soft tissue support. OPERATIVE TECHNIQUE:  Informed consent was obtained from the patient. The risks, benefits, and alternatives were reviewed with the patient preoperatively. She expressed understanding and wished to proceed. The patient was marked in the preoperative holding area in the upright position. The midline and inframammary folds were marked. Bilateral nipple areolar complexes were encompassed in obliquely oriented ellipses. The patient was then taken to the operating room by Dr. Amanda Lay team where she underwent bilateral mastectomy. Once the right-sided mastectomy was completed, I entered the room, and the reconstruction began on the right side using a separate set up.   The mastectomy skin flaps were inspected and appeared to be of suitable thickness and viability to facilitate immediate reconstruction. The submuscular implant was noted to be intact. Significant attenuation of the capsule was noted particularly inferomedially. The lower pole capsule was then excised with electrocautery and sent for permanent pathologic analysis. A Silimed 370 mL textured implant was then retrieved intact. The pocket was then inspected. There was no periprosthetic fluid noted. There were no visible or palpable nodules noted. The submuscular pocket was then expanded using electrocautery superiorly and medially. Next, the pocket was rinsed with Betadine irrigation and then antibiotic irrigation until clear. Hemostasis was secured with electrocautery. Gloves were then changed, and the tissue expander was brought on the field and immediately bathed in antibiotic irrigation. It was checked for integrity and noted to be intact. The tissue expander was accessed and filled to its capacity with air. The tissue expander was placed in the partial submuscular position, and the suture tabs were secured to the chest wall with interrupted 2-0 PDS sutures. Next, a sheet of large contoured perforated AlloDerm was brought on the field and prepared in saline. The AlloDerm was then brought onto the field utilizing a no-touch technique. The AlloDerm was secured to the remaining edge of the capsule along the inframammary fold and laterally along the serratus fascia with a running 2-0 PDS suture. Superiorly it was secured to the inferior aspect of pectoralis muscle with a running 2-0 PDS suture. Two 15-[place] To drains were exited through lateral stab incisions and sutured to the skin with 3-0 nylon sutures. The mastectomy margins were inspected and appeared well perfused. They were repaired in a layered fashion with interrupted 3-0 Vicryl deep dermal suture and running 4-0 Monocryl subcuticular suture. At this time, the left mastectomy was completed.   Attention was then turned to the left side. The mastectomy skin flaps were inspected and appeared to be of suitable thickness and viability to facilitate immediate reconstruction. Again, a partial submuscular implant was noted with attenuation of the lower pole capsule. This was excised and sent for permanent pathologic analysis. Again, a Silimed 370 mL textured implant was retrieved and found to be intact. The pocket was inspected, and there was no periprosthetic fluid noted. There were no visible or palpable nodules noted. Again, the submuscular pocket was developed with electrocautery superiorly and medially. The gloves were then changed, and the second tissue expander was brought on the field and bathed in antibiotic irrigation. It was checked for integrity and noted to be intact. The expander was then filled to its capacity with air. It was then delivered to the pocket. It was secured to the chest wall with interrupted 2-0 PDS sutures. Next, a second sheet of large contoured perforated AlloDerm was brought on the field and prepared in saline. The AlloDerm was then secured along the remaining capsule along the inframammary fold with running 2-0 PDS suture. Laterally secured to the serratus fascia. Superiorly, the AlloDerm was secured to the inferior aspect of pectoralis muscle with running 2-0 PDS suture. The pockets were then inspected, and hemostasis was secured with electrocautery. Two 15-[place] To drains were exited through lateral stab incisions and sutured to the skin with 3-0 nylon suture. The mastectomy margins were inspected and appeared well perfused. They were repaired in a layered fashion with interrupted 3-0 Vicryl deep dermal suture and running 4-0 Monocryl subcuticular suture. The tissue expander was then accessed and all air was aspirated. Each expander was filled with 200 mL of saline which placed minimal tension on the closure. Exofin and Steri-Strips were placed on the incision. Fluff gauze and a surgical bra were applied. Biopatches and Tegaderms were placed on the drain sites. The patient was awakened, extubated, and taken to the recovery area in stable condition. There were no operative complications. All needle, sponge, and instrument counts were correct at the end of the procedure. Dictated By Carlo Lopez M.D.  d: 02/23/2022 13:23:02  t: 02/23/2022 16:55:36  [facility] [id number]  [facility]/

## 2022-02-23 NOTE — ANESTHESIA POSTPROCEDURE EVALUATION
Gordy Andrea Patient Status:  Hospital Outpatient Surgery   Age/Gender 42year old female MRN [id number]   [facility] SURGERY Attending Fadia Camarena MD   Hosp Day # 0 PCP Fady Pinedo DO       Anesthesia Post-op Note    Bilateral mastectomy with right sentinel lymph node biopsy, right lymphoscintigraphy    Procedure Summary     Date: 02/23/22 Room / Location: [facility] MAIN OR  / [facility] MAIN OR    Anesthesia Start: 7066 Anesthesia Stop: 5251    Procedures:       Bilateral mastectomy with right sentinel lymph node biopsy, right lymphoscintigraphy (Bilateral Breast)      Immediate bilateral breast reconstruction with placement of bilateral breast tissue expanders with acellular dermal matrix (Bilateral Breast) Diagnosis:       Malignant neoplasm of right female breast, unspecified estrogen receptor status, unspecified site of breast ([address]      (Malignant neoplasm of right female breast, unspecified estrogen receptor status, unspecified site of breast ([address] Doreatha Click)    Surgeons: Fadia Camarena MD; Destinee Carbajal MD Anesthesiologist: Sophie Bassett MD    Anesthesia Type: general ASA Status: 2          Anesthesia Type: general    Vitals Value Taken Time   /71 02/23/22 1304   Temp 99.2 02/23/22 1304   Pulse 93 02/23/22 1304   Resp 15 02/23/22 1304   SpO2 97 02/23/22 1304       Patient Location: PACU    Anesthesia Type: general    Airway Patency: patent and extubated    Postop Pain Control: adequate    Mental Status: mildly sedated but able to meaningfully participate in the post-anesthesia evaluation    Nausea/Vomiting: minimal    Cardiopulmonary/Hydration status: stable euvolemic    Complications: no apparent anesthesia related complications    Postop vital signs: stable    Dental Exam: Unchanged from Preop    Patient to be discharged from PACU when criteria met.

## 2022-02-23 NOTE — ANESTHESIA PROCEDURE NOTES
Airway  Date/Time: 2/23/2022 7:36 AM  Urgency: elective      General Information and Staff    Patient location during procedure: OR  Anesthesiologist: Shwetha Reid MD  Performed: anesthesiologist     Indications and Patient Condition  Indications for airway management: anesthesia  Sedation level: deep  Preoxygenated: yes  Patient position: sniffing  Mask difficulty assessment: 1 - vent by mask    Final Airway Details  Final airway type: endotracheal airway      Successful airway: ETT  Cuffed: yes   Successful intubation technique: direct laryngoscopy  Endotracheal tube insertion site: oral    Cormack-Lehane Classification: grade IIA - partial view of glottis  Placement verified by: chest auscultation and capnometry   Measured from: lips  ETT to lips (cm): 21  Number of attempts at approach: 1

## 2022-02-23 NOTE — BRIEF OP NOTE
Pre-Operative Diagnosis: Malignant neoplasm of right female breast, unspecified estrogen receptor status, unspecified site of breast ([facility] [C50.911]     Post-Operative Diagnosis: Malignant neoplasm of right female breast, unspecified estrogen receptor status, unspecified site of breast ([facility] [C50.911]      Procedure Performed:   Bilateral mastectomy with right sentinel lymph node biopsy, right lymphoscintigraphy    Surgeon(s) and Role:  Panel 1:     * Savanna Velasquez MD - Primary    Assistant(s):  PAMamadou Auguste PA-C     Surgical Findings: sentinel lymph node negative for carcinoma per frozen section     Specimen: bilateral mastectomy, right axillary sentinel lymph nodes     Estimated Blood Loss: 100 ml     Dictation Number:  n/a    Chey Huston PA-C  2/23/2022  11:30 AM

## 2022-02-23 NOTE — PROGRESS NOTES
Plan for bilateral SS-mastectomy by Dr. Kathy Mclaughlin and immediate reconstruction with tissue expander and acellular dermal matrix reviewed with patient. The risks of surgery including but not limited to bleeding, infection, scarring, delayed wound healing, seroma, asymmetry, implant infection/extrusion requiring removal, expander deflation, injury to adjacent structures, capsular contracture, ALCL, and need for further surgery were reviewed. The expected post-operative course was discussed. Questions were answered to the patient and 's satisfaction. No guarantees as to outcome were offered. The patient expresses understanding and wishes to proceed.

## 2022-02-23 NOTE — BRIEF OP NOTE
Pre-Operative Diagnosis: Malignant neoplasm of right female breast, unspecified estrogen receptor status, unspecified site of breast ([facility] [C50.911]     Post-Operative Diagnosis: Malignant neoplasm of right female breast, unspecified estrogen receptor status, unspecified site of breast ([facility] [C50.911]      Procedure Performed:   Bilateral mastectomy with right sentinel lymph node biopsy, right lymphoscintigraphy    Surgeon(s) and Role:  Panel 1:     * Joel Lay MD - Primary  Panel 2:     Kasie Kruse MD - Primary    Assistant(s):  PA: Flori Newman PA-C     Surgical Findings: See dictation     Specimen: Bilateral breast capsule     Estimated Blood Loss: Blood Output: 125 mL (2/23/2022 12:55 PM)        Elias Chapman MD  2/23/2022  1:16 PM

## 2022-02-23 NOTE — ANESTHESIA PROCEDURE NOTES
Regional Block  Performed by: Samina Coto MD  Authorized by: Samina Coto MD       General Information and Staff    Start Time:   Anesthesiologist: Samina Coto MD  Patient Location:  OR    Block Placement: Post Induction  Site Identification: real time ultrasound guided and image stored and retrievable    Block site/laterality marked before start: site marked  Reason for Block: at surgeon's request and post-op pain management    Preanesthetic Checklist: 2 patient identifers, IV checked, risks and benefits discussed, monitors and equipment checked, pre-op evaluation, timeout performed, anesthesia consent, sterile technique used, no prohibitive neurological deficits and no local skin infection at insertion site      Procedure Details    Patient Position:  Supine  Prep: ChloraPrep    Monitoring:  Cardiac monitor, continuous pulse ox and blood pressure cuff  Anesthesia block type: serratus block. Laterality:  Bilateral  Injection Technique:  Single-shot    Needle    Needle Type:  Short-bevel and echogenic  Needle Gauge:  21 G  Needle Length:  100 mm  Needle Localization:  Ultrasound guidance  Reason for Ultrasound Use: appropriate spread of the medication was noted in real time and no ultrasound evidence of intravascular and/or intraneural injection            Assessment    Injection Assessment:  Good spread noted, negative resistance, negative aspiration for heme, incremental injection and low pressure  Heart Rate Change: No    - Patient tolerated block procedure well without evidence of immediate block related complications.      Medications      Additional Comments    Medication:  Bupivacaine 0.25% 30mL Bilaterally with Dexamethasone 2mg PF 21

## 2022-02-24 ENCOUNTER — TELEPHONE (OUTPATIENT)
Dept: SURGERY | Facility: CLINIC | Age: 42
End: 2022-02-24

## 2022-02-24 NOTE — TELEPHONE ENCOUNTER
Called to check on patient after her surgery with Dr. Kezia Nicholas and Dr. Rama Mora. Pt doing well overall. Pain controlled with norco, no nausea or vomiting. On Colace. LETA drains patent and pt/ maintaining drain log. Post op appt made with Dr. Kezia Nicholas on 3/4 at 12 noon at [place], path results pending. Pt and  know to call with any questions or concerns.

## 2022-03-01 ENCOUNTER — NURSE NAVIGATOR ENCOUNTER (OUTPATIENT)
Dept: HEMATOLOGY/ONCOLOGY | Facility: HOSPITAL | Age: 42
End: 2022-03-01

## 2022-03-01 NOTE — PROGRESS NOTES
Called patient in regards to scheduling an appointment to discuss her surgical pathology and adjuvant treatment plan after her breast cancer surgery. Scheduled patient for Thursday March 3, 2022 at 1300 in the [facility]. Patient thanked me for the phone call and assistance. Pt was provided with breast nurse navigator contact information and was encouraged to phone with any other questions or concerns.

## 2022-03-03 ENCOUNTER — OFFICE VISIT (OUTPATIENT)
Dept: HEMATOLOGY/ONCOLOGY | Facility: HOSPITAL | Age: 42
End: 2022-03-03
Attending: GENETIC COUNSELOR, MS
Payer: COMMERCIAL

## 2022-03-03 VITALS
TEMPERATURE: 98 F | OXYGEN SATURATION: 97 % | RESPIRATION RATE: 18 BRPM | SYSTOLIC BLOOD PRESSURE: 135 MMHG | HEART RATE: 85 BPM | BODY MASS INDEX: 28 KG/M2 | DIASTOLIC BLOOD PRESSURE: 82 MMHG | WEIGHT: 194 LBS

## 2022-03-03 DIAGNOSIS — C50.811 MALIGNANT NEOPLASM OF OVERLAPPING SITES OF RIGHT BREAST IN FEMALE, ESTROGEN RECEPTOR POSITIVE (HCC): Primary | ICD-10-CM

## 2022-03-03 DIAGNOSIS — D72.820 LYMPHOCYTOSIS: ICD-10-CM

## 2022-03-03 DIAGNOSIS — Z17.0 MALIGNANT NEOPLASM OF OVERLAPPING SITES OF RIGHT BREAST IN FEMALE, ESTROGEN RECEPTOR POSITIVE (HCC): Primary | ICD-10-CM

## 2022-03-03 PROCEDURE — 99245 OFF/OP CONSLTJ NEW/EST HI 55: CPT | Performed by: INTERNAL MEDICINE

## 2022-03-03 NOTE — PROGRESS NOTES
Patient is here with her  for consultation for breast cancer. She has recently had surgery for right mastectomy. She continues to have some discomfort in the right breast, axilla and arm. She still has drains in place. She see the breast surgeon tomorrow to hopefully have these removed. She is using ibuprofen more for pain then the hydrocodone due to significant constipation. She is using stool softener and miralax for this. Her general health is good. She is physically active prior to surgery. She is eating well. She is here for treatment recommendations.      Education Record    Learner:  Patient and Spouse    Disease / Diagnosis:  Breast cancer    Barriers / Limitations:  None   Comments:    Method:  Discussion   Comments:    General Topics:  Plan of care reviewed   Comments:    Outcome:  Shows understanding   Comments:

## 2022-03-04 ENCOUNTER — OFFICE VISIT (OUTPATIENT)
Dept: SURGERY | Facility: CLINIC | Age: 42
End: 2022-03-04
Payer: COMMERCIAL

## 2022-03-04 ENCOUNTER — HOSPITAL ENCOUNTER (OUTPATIENT)
Dept: GENERAL RADIOLOGY | Age: 42
Discharge: HOME | End: 2022-03-04
Attending: INTERNAL MEDICINE
Payer: COMMERCIAL

## 2022-03-04 ENCOUNTER — NURSE NAVIGATOR ENCOUNTER (OUTPATIENT)
Dept: HEMATOLOGY/ONCOLOGY | Facility: HOSPITAL | Age: 42
End: 2022-03-04

## 2022-03-04 VITALS
DIASTOLIC BLOOD PRESSURE: 79 MMHG | HEART RATE: 92 BPM | SYSTOLIC BLOOD PRESSURE: 114 MMHG | RESPIRATION RATE: 16 BRPM | OXYGEN SATURATION: 98 %

## 2022-03-04 DIAGNOSIS — Z90.13 ABSENCE OF BREAST, BILATERAL: Primary | ICD-10-CM

## 2022-03-04 DIAGNOSIS — Z17.0 MALIGNANT NEOPLASM OF OVERLAPPING SITES OF RIGHT BREAST IN FEMALE, ESTROGEN RECEPTOR POSITIVE (HCC): ICD-10-CM

## 2022-03-04 DIAGNOSIS — C50.811 MALIGNANT NEOPLASM OF OVERLAPPING SITES OF RIGHT BREAST IN FEMALE, ESTROGEN RECEPTOR POSITIVE (HCC): ICD-10-CM

## 2022-03-04 PROCEDURE — 99024 POSTOP FOLLOW-UP VISIT: CPT | Performed by: SURGERY

## 2022-03-04 PROCEDURE — 71046 X-RAY EXAM CHEST 2 VIEWS: CPT | Performed by: INTERNAL MEDICINE

## 2022-03-04 PROCEDURE — 3074F SYST BP LT 130 MM HG: CPT | Performed by: SURGERY

## 2022-03-04 PROCEDURE — 99024 POSTOP FOLLOW-UP VISIT: CPT | Performed by: PHYSICIAN ASSISTANT

## 2022-03-04 PROCEDURE — 3078F DIAST BP <80 MM HG: CPT | Performed by: SURGERY

## 2022-03-04 NOTE — PROGRESS NOTES
This is a 79-year-old female that is 9 days status post her bilateral skin sparing mastectomies and right sentinel lymph node biopsy by Dr. Helen Bah with removal of prior Sillmed 370 cc breast implants with placement of tissue expanders in the partial subpectoral position, AlloDerm ADM, intraoperative 200 cc saline fill by Dr. Lobo Ruth. She is here today for postop wound check, drain evaluation. She denies fevers or signs of infection since her procedure. She denies nausea or vomiting. Denies chest pain, calf pain, shortness of breath. She has been compliant with compression, drain recording, activity restrictions, antibiotics. Exam:  Bilateral breast incisions clean dry and intact no evidence of infection cellulitis or drainage  No evidence of hematoma or seroma bilaterally  Bilateral mastectomy skin without any erythema ecchymosis hyperemia skin breakdown or necrosis  Drain sites clean dry and intact drainage serosanguineous    Impression:  9 days status post her bilateral skin sparing mastectomies and right sentinel lymph node biopsy by Dr. Helen Bah with removal of prior [place] 370 cc breast implants with placement of tissue expanders in the partial subpectoral position, AlloDerm ADM, intraoperative 200 cc saline fill by Dr. Brady Roch:  1 drain on each side was removed today due to low output. She tolerated this well. New drain site dressings were placed. Antibiotics should continue until remaining drains are removed. Discussion was had about how to shower, activity restrictions, drain parameters. She is awaiting Oncotype / FISH testing to determine chemotherapy benefit. I reviewed all continued wound care, signs of infection, reasons to contact our office. She will follow-up when her remaining drains are ready for removal.  I reviewed the expansion process with her and answered all of their questions regarding timelines for moving forward.

## 2022-03-04 NOTE — PROGRESS NOTES
Met with patient and her  during Dr. Marcy Mehta clinic. Discussed with patient that next week her HER-2/FISH results will result on Thursday or Friday and I will call her with the results and discuss with Dr. Alvina Titus on if he will send for the Oncotype test. Provided Oncotype testing information sheet for the patient. Patient reported she has not had a good bowel movement in almost one week and has used Miralax for 3 days and we discussed the use of a fleets enema. Discussed this also with Dr. Marcy Mehta nurse, Maria E Nolasco, also. Patient verbalized understanding on usage of stool softeners and enemas and when to contact her surgeon. Patient thanked me for the assistance and information I provided to her and  today. Pt was provided with breast nurse navigator contact information and was encouraged to phone with any other questions or concerns.

## 2022-03-07 ENCOUNTER — OFFICE VISIT (OUTPATIENT)
Dept: SURGERY | Facility: CLINIC | Age: 42
End: 2022-03-07
Payer: COMMERCIAL

## 2022-03-07 DIAGNOSIS — Z90.13 ABSENCE OF BREAST, BILATERAL: Primary | ICD-10-CM

## 2022-03-07 PROCEDURE — 99024 POSTOP FOLLOW-UP VISIT: CPT | Performed by: PHYSICIAN ASSISTANT

## 2022-03-07 NOTE — PROGRESS NOTES
Larisa Hsu is a 42year old female who presents today for a follow-up after bilateral skin sparing mastectomies and right sentinel lymph node biopsy by Dr. Nasreen Syed with removal of prior Sillmed 370 cc breast implants with placement of tissue expanders in the partial subpectoral position, AlloDerm ADM, intraoperative 200 cc saline fill by Dr. Kamran Vega on 2/23/2022. She denies fever and chills. She denies nausea, vomiting, diarrhea or constipation. Her pain is controlled. Her previous constipation has resolved. She has been compliant with compression, activity restrictions, drain care, taking her antibiotic. Physical Exam     Bilateral breast incisions clean dry and intact no evidence of infection cellulitis or drainage  No evidence of hematoma or seroma bilaterally  Bilateral mastectomy skin without any erythema ecchymosis hyperemia skin breakdown or necrosis  Drain sites clean dry and intact drainage serosanguineous    There were no vitals filed for this visit. Assessment and Plan     Larisa Hsu is doing well s/p bilateral skin sparing mastectomies and right sentinel lymph node biopsy by Dr. Nasreen Syed with removal of prior Sillmed 370 cc breast implants with placement of tissue expanders in the partial subpectoral position, AlloDerm ADM, intraoperative 200 cc saline fill by Dr. Kamran Vega on 2/23/2022. Both remaining drains were removed due to low output. Neosporin, gauze, Tegaderm was placed. She tolerated this well. She can discontinue her antibiotic. I recommend she continue with activity restrictions and compression. She is awaiting Oncotype / FISH testing to determine chemotherapy benefit. We reviewed reasons to contact our office. She will follow-up next week with Dr. Kamran Vega. Questions were answered. Patient understands.      Dino Geller [place]  3/7/2022  1:05 PM

## 2022-03-10 ENCOUNTER — TELEPHONE (OUTPATIENT)
Dept: HEMATOLOGY/ONCOLOGY | Facility: HOSPITAL | Age: 42
End: 2022-03-10

## 2022-03-10 ENCOUNTER — TELEPHONE (OUTPATIENT)
Dept: SURGERY | Facility: CLINIC | Age: 42
End: 2022-03-10

## 2022-03-10 ENCOUNTER — NURSE NAVIGATOR ENCOUNTER (OUTPATIENT)
Dept: HEMATOLOGY/ONCOLOGY | Facility: HOSPITAL | Age: 42
End: 2022-03-10

## 2022-03-10 LAB
CD10 CELLS NFR SPEC: 3 %
CD11C CELLS NFR SPEC: 14 %
CD14 CELLS NFR SPEC: 1 %
CD19 CELLS NFR SPEC: 53 %
CD19/CD10 CELLS: 2 %
CD20 CELLS NFR SPEC: 50 %
CD22 CELLS NFR SPEC: 46 %
CD23 CELLS NFR SPEC: 51 %
CD3 CELLS NFR SPEC: 41 %
CD3+CD4+ CELLS NFR SPEC: 26 %
CD3+CD4+ CELLS/CD3+CD8+ CLL SPEC: 1.9
CD3+CD8+ CELLS NFR SPEC: 14 %
CD38 CELLS NFR SPEC: 2 %
CD45 CELLS NFR SPEC: 100 %
CD5 CELLS NFR SPEC: 89 %
CD5/CD19 CELLS: 51 %
CD56 CELLS NFR SPEC: 8 %
CD7 CELLS NFR SPEC: 43 %
CELL SURF KAPPA/LAMBDA RATIO: 0.1
CELL SURF LAMBDA LIGHT CHAIN: 42 %
CELL SURFACE KAPPA LIGHT CHAIN: 3 %
FMC7 CELLS NFR SPEC: 2 %

## 2022-03-10 NOTE — PROGRESS NOTES
Called patient and verified her full name and  and provided her results of her surgical pathology HER-2/FISH results that were negative. I stated that per Dr. Fox Kurtz recommendations we will be proceeding with ordering the Oncotype testing that he discussed with her. I explained to her that the test usually results within 2 weeks. Patient requested a follow-up appointment with Dr. Bruce Reza to review the testing results and patient was scheduled with Dr. Bruce Reza on 2022 at 0900 in the [facility]. Patient thanked me for the phone call and assistance. Pt was provided with breast nurse navigator contact information and was encouraged to phone with any other questions or concerns.

## 2022-03-10 NOTE — TELEPHONE ENCOUNTER
Patient called and asked if Akshat Sim talked to patient regarding the lab results. Please call Dick Camacho at [phone]. Thank you.

## 2022-03-10 NOTE — TELEPHONE ENCOUNTER
Pt called with c/o R elbow pain, states pain appears to be musculoskeletal. Pain is described as a sore muscle, no bruising or redness, no open area, no fevers or chills. I told pt this pain is likely r/t the positioning in surgery for the sentinel lymph node biopsy. Advised her to continue to do light activity/exercises with this arm. Pt to call us with worsening pain, redness or fevers and we will schedule her an acute care post op visit. Pt appreciated the information.

## 2022-03-15 ENCOUNTER — OFFICE VISIT (OUTPATIENT)
Dept: SURGERY | Facility: CLINIC | Age: 42
End: 2022-03-15
Payer: COMMERCIAL

## 2022-03-15 DIAGNOSIS — Z90.13 ABSENCE OF BREAST, BILATERAL: Primary | ICD-10-CM

## 2022-03-15 PROCEDURE — 99024 POSTOP FOLLOW-UP VISIT: CPT | Performed by: SURGERY

## 2022-03-22 ENCOUNTER — OFFICE VISIT (OUTPATIENT)
Dept: SURGERY | Facility: CLINIC | Age: 42
End: 2022-03-22
Payer: COMMERCIAL

## 2022-03-22 ENCOUNTER — NURSE NAVIGATOR ENCOUNTER (OUTPATIENT)
Dept: HEMATOLOGY/ONCOLOGY | Facility: HOSPITAL | Age: 42
End: 2022-03-22

## 2022-03-22 DIAGNOSIS — Z90.13 ABSENCE OF BREAST, BILATERAL: Primary | ICD-10-CM

## 2022-03-22 PROCEDURE — 99024 POSTOP FOLLOW-UP VISIT: CPT | Performed by: SURGERY

## 2022-03-22 PROCEDURE — 99024 POSTOP FOLLOW-UP VISIT: CPT | Performed by: PHYSICIAN ASSISTANT

## 2022-03-22 NOTE — PROGRESS NOTES
Called patient and verified her full name and  and provided patient with her Oncotype testing results of 13. Stated that no recommendations for chemotherapy is needed provided from the results and that at her appointment with Dr. Ignatius Lombard on 2022 that he will review with her the results in its entirety and his recommendations. Patient thanked me for the phone call. Pt was provided with the breast nurse navigators contact information and was encouraged to phone with any other questions or concerns.

## 2022-03-28 ENCOUNTER — OFFICE VISIT (OUTPATIENT)
Dept: SURGERY | Facility: CLINIC | Age: 42
End: 2022-03-28
Payer: COMMERCIAL

## 2022-03-28 DIAGNOSIS — Z90.13 ABSENCE OF BREAST, BILATERAL: Primary | ICD-10-CM

## 2022-03-28 PROCEDURE — 99024 POSTOP FOLLOW-UP VISIT: CPT | Performed by: PHYSICIAN ASSISTANT

## 2022-03-29 ENCOUNTER — OFFICE VISIT (OUTPATIENT)
Dept: HEMATOLOGY/ONCOLOGY | Facility: HOSPITAL | Age: 42
End: 2022-03-29
Attending: GENETIC COUNSELOR, MS
Payer: COMMERCIAL

## 2022-03-29 VITALS
RESPIRATION RATE: 18 BRPM | HEART RATE: 76 BPM | BODY MASS INDEX: 28 KG/M2 | OXYGEN SATURATION: 99 % | SYSTOLIC BLOOD PRESSURE: 106 MMHG | WEIGHT: 193.38 LBS | DIASTOLIC BLOOD PRESSURE: 69 MMHG | TEMPERATURE: 98 F

## 2022-03-29 DIAGNOSIS — Z17.0 MALIGNANT NEOPLASM OF OVERLAPPING SITES OF RIGHT BREAST IN FEMALE, ESTROGEN RECEPTOR POSITIVE (HCC): Primary | ICD-10-CM

## 2022-03-29 DIAGNOSIS — D72.820 MONOCLONAL B-CELL LYMPHOCYTOSIS OF UNDETERMINED SIGNIFICANCE: ICD-10-CM

## 2022-03-29 DIAGNOSIS — C50.811 MALIGNANT NEOPLASM OF OVERLAPPING SITES OF RIGHT BREAST IN FEMALE, ESTROGEN RECEPTOR POSITIVE (HCC): Primary | ICD-10-CM

## 2022-03-29 PROCEDURE — 99215 OFFICE O/P EST HI 40 MIN: CPT | Performed by: INTERNAL MEDICINE

## 2022-03-29 RX ORDER — TAMOXIFEN CITRATE 20 MG/1
20 TABLET ORAL DAILY
Qty: 90 TABLET | Refills: 3 | Status: SHIPPED | OUTPATIENT
Start: 2022-03-29 | End: 2022-06-27

## 2022-03-29 NOTE — PROGRESS NOTES
Patient is here for follow up for breast cancer. She is recovering well from surgery. She is no longer taking pain medication other then Advil. She reports that her mood is better after hearing negative results and she is waiting to hear recommendations about next steps.      Education Record    Learner:  Patient and Family Member    Disease / Diagnosis: Breast cancer    Barriers / Limitations:  None   Comments:    Method:  Discussion   Comments:    General Topics:  Plan of care reviewed   Comments:    Outcome:  Shows understanding   Comments:

## 2022-03-31 ENCOUNTER — TELEPHONE (OUTPATIENT)
Dept: SURGERY | Facility: CLINIC | Age: 42
End: 2022-03-31

## 2022-03-31 NOTE — TELEPHONE ENCOUNTER
Patient called asking for return to work note for Monday 4/4/22. Letter sent to [facility] and patient was called to inform. voicemail was left.

## 2022-04-05 ENCOUNTER — OFFICE VISIT (OUTPATIENT)
Dept: SURGERY | Facility: CLINIC | Age: 42
End: 2022-04-05
Payer: COMMERCIAL

## 2022-04-05 DIAGNOSIS — Z90.13 ABSENCE OF BREAST, BILATERAL: Primary | ICD-10-CM

## 2022-04-05 PROCEDURE — 99024 POSTOP FOLLOW-UP VISIT: CPT | Performed by: SURGERY

## 2022-04-05 NOTE — PROGRESS NOTES
Modesto Murray is a 42year old female who presents today for further expansion. Physical Examination:  Breasts: Bilateral breast incisions are clean dry and intact. Procedure: Bilateral breasts are sterilely expanded with 60 cc of saline with total of 440 cc. No seroma fluid was encountered. Assessment and Plan:  Patient is doing well. She will follow-up in 1 week for further expansion.

## 2022-04-14 ENCOUNTER — NURSE ONLY (OUTPATIENT)
Dept: SURGERY | Facility: CLINIC | Age: 42
End: 2022-04-14
Payer: COMMERCIAL

## 2022-04-21 ENCOUNTER — NURSE ONLY (OUTPATIENT)
Dept: SURGERY | Facility: CLINIC | Age: 42
End: 2022-04-21
Payer: COMMERCIAL

## 2022-04-29 ENCOUNTER — OFFICE VISIT (OUTPATIENT)
Dept: SURGERY | Facility: CLINIC | Age: 42
End: 2022-04-29
Payer: COMMERCIAL

## 2022-04-29 DIAGNOSIS — Z90.13 ABSENCE OF BREAST, BILATERAL: Primary | ICD-10-CM

## 2022-04-29 PROCEDURE — 99024 POSTOP FOLLOW-UP VISIT: CPT | Performed by: PHYSICIAN ASSISTANT

## 2022-05-06 ENCOUNTER — OFFICE VISIT (OUTPATIENT)
Dept: SURGERY | Facility: CLINIC | Age: 42
End: 2022-05-06
Payer: COMMERCIAL

## 2022-05-06 DIAGNOSIS — Z90.13 ABSENCE OF BREAST, BILATERAL: Primary | ICD-10-CM

## 2022-05-06 NOTE — PROGRESS NOTES
Bridgett Finney is a 42year old female who presents today for a follow-up. She denies fever and chills. She denies nausea, vomiting, diarrhea or constipation. She currently has a 14.6 cm base diameter tissue expander filled to 590 cc. Physical Examination:  Breasts: Bilateral breast incisions are clean dry and intact. Good shape and symmetry is appreciated. Significant contour abnormalities noted in the upper pole of bilateral breast.  Abdomen: Moderate central abdominal and flank lipodystrophy is noted. There are no palpable hernias noted. Assessment and Plan:  We discussed the plan for the second stage which will include removal of bilateral breast tissue expanders, placement of smooth, round, silicone implants, and fat grafting to bilateral reconstructed breasts. The nature of the procedure was reviewed with the patient. We discussed the risks of surgery including but not limited to bleeding, infection, scarring, delayed wound healing, asymmetry, implant infection or extrusion requiring removal, ALCL, capsular contracture, hypertrophic scarring or keloid, injury to intra-abdominal structures, contour abnormalities, cysts or calcifications requiring biopsy, and need for further surgery. We reviewed the expected postoperative course including possible need for drains, as well as need for activity limitation and compression. Multiple questions were answered the patient's satisfaction. No guarantees as to outcome were offered. The patient expresses understanding and wishes to proceed.

## 2022-05-11 ENCOUNTER — TELEPHONE (OUTPATIENT)
Dept: HEMATOLOGY/ONCOLOGY | Age: 42
End: 2022-05-11

## 2022-05-24 ENCOUNTER — TELEPHONE (OUTPATIENT)
Dept: SURGERY | Facility: CLINIC | Age: 42
End: 2022-05-24

## 2022-05-24 DIAGNOSIS — Z90.13 ABSENCE OF BREAST, BILATERAL: Primary | ICD-10-CM

## 2022-05-24 NOTE — TELEPHONE ENCOUNTER
Calling pt in regards to scheduling surgery. Informed pt that I have 10/27/2022 available at [facility] with Dr. Yessenia Juarez. Pt verbalized understanding and in agreement with date and location. All questions answered. Encouraged pt to call or [employer] message office with any other questions or concerns.

## 2022-07-05 ENCOUNTER — OFFICE VISIT (OUTPATIENT)
Dept: SURGERY | Facility: CLINIC | Age: 42
End: 2022-07-05
Payer: COMMERCIAL

## 2022-07-05 DIAGNOSIS — Z90.13 ABSENCE OF BREAST, BILATERAL: Primary | ICD-10-CM

## 2022-07-05 PROCEDURE — 99212 OFFICE O/P EST SF 10 MIN: CPT | Performed by: SURGERY

## 2022-07-05 NOTE — PROGRESS NOTES
Leroy Andrews is a 42year old female who presents today for a follow-up. She reports bilateral breast discomfort left greater than right. She denies fever and chills. She has resumed regular activity. Physical Examination:  Breasts: Bilateral breast incisions are clean dry and intact. There is no erythema or seroma noted. There are no palpable nodules noted. Mild tenderness to palpation is noted at the lateral aspects of bilateral breast.    Assessment and Plan:  Patient doing well. She was reassured that I see no abnormalities on examination today and that her discomfort is likely secondary to the expanders. The patient was counseled that this should improve with placement of the permanent implants. Multiple questions were answered the patient satisfaction and she was appreciative of the evaluation. She will follow-up with her previously scheduled surgery date in the fall.

## 2022-07-25 PROBLEM — Z90.13 S/P BILATERAL MASTECTOMY: Status: ACTIVE | Noted: 2022-07-25

## 2022-08-25 NOTE — ED AVS SNAPSHOT
Tonia Blair   MRN: [id number]    Department:  THE [facility] Emergency Department in [place]   Date of Visit:  11/29/2019           Disclosure     Insurance plans vary and the physician(s) referred by the ER may not be covered by your plan.  Please c tell this physician (or your personal doctor if your instructions are to return to your personal doctor) about any new or lasting problems. The primary care or specialist physician will see patients referred from the [facility] Emergency Department.  Veronica Harvey Bill For Surgical Tray: no

## 2022-09-16 ENCOUNTER — OFFICE VISIT (OUTPATIENT)
Dept: SURGERY | Facility: CLINIC | Age: 42
End: 2022-09-16
Payer: COMMERCIAL

## 2022-09-16 VITALS
HEART RATE: 74 BPM | DIASTOLIC BLOOD PRESSURE: 78 MMHG | RESPIRATION RATE: 16 BRPM | BODY MASS INDEX: 27.06 KG/M2 | OXYGEN SATURATION: 97 % | SYSTOLIC BLOOD PRESSURE: 118 MMHG | WEIGHT: 189 LBS | HEIGHT: 70 IN | TEMPERATURE: 98 F

## 2022-09-16 DIAGNOSIS — C50.911 MALIGNANT NEOPLASM OF RIGHT FEMALE BREAST, UNSPECIFIED ESTROGEN RECEPTOR STATUS, UNSPECIFIED SITE OF BREAST (HCC): Primary | ICD-10-CM

## 2022-09-16 PROCEDURE — 3008F BODY MASS INDEX DOCD: CPT | Performed by: SURGERY

## 2022-09-16 PROCEDURE — 3074F SYST BP LT 130 MM HG: CPT | Performed by: SURGERY

## 2022-09-16 PROCEDURE — 99213 OFFICE O/P EST LOW 20 MIN: CPT | Performed by: SURGERY

## 2022-09-16 PROCEDURE — 3078F DIAST BP <80 MM HG: CPT | Performed by: SURGERY

## 2022-09-21 RX ORDER — ESCITALOPRAM OXALATE 10 MG/1
TABLET ORAL
Qty: 30 TABLET | Refills: 0 | Status: SHIPPED | OUTPATIENT
Start: 2022-09-21

## 2022-09-28 ENCOUNTER — OFFICE VISIT (OUTPATIENT)
Dept: HEMATOLOGY/ONCOLOGY | Facility: HOSPITAL | Age: 42
End: 2022-09-28
Attending: GENETIC COUNSELOR, MS

## 2022-09-28 VITALS
WEIGHT: 192 LBS | OXYGEN SATURATION: 99 % | TEMPERATURE: 99 F | HEART RATE: 71 BPM | DIASTOLIC BLOOD PRESSURE: 78 MMHG | BODY MASS INDEX: 27.49 KG/M2 | RESPIRATION RATE: 16 BRPM | HEIGHT: 70 IN | SYSTOLIC BLOOD PRESSURE: 117 MMHG

## 2022-09-28 DIAGNOSIS — Z17.0 MALIGNANT NEOPLASM OF OVERLAPPING SITES OF RIGHT BREAST IN FEMALE, ESTROGEN RECEPTOR POSITIVE (HCC): Primary | ICD-10-CM

## 2022-09-28 DIAGNOSIS — D72.820 MONOCLONAL B-CELL LYMPHOCYTOSIS OF UNDETERMINED SIGNIFICANCE: ICD-10-CM

## 2022-09-28 DIAGNOSIS — C50.811 MALIGNANT NEOPLASM OF OVERLAPPING SITES OF RIGHT BREAST IN FEMALE, ESTROGEN RECEPTOR POSITIVE (HCC): Primary | ICD-10-CM

## 2022-09-28 LAB
ALBUMIN SERPL-MCNC: 3.9 G/DL (ref 3.4–5)
ALBUMIN/GLOB SERPL: 1.1 {RATIO} (ref 1–2)
ALP LIVER SERPL-CCNC: 53 U/L
ALT SERPL-CCNC: 33 U/L
ANION GAP SERPL CALC-SCNC: 5 MMOL/L (ref 0–18)
AST SERPL-CCNC: 31 U/L (ref 15–37)
BASOPHILS # BLD AUTO: 0.06 X10(3) UL (ref 0–0.2)
BASOPHILS NFR BLD AUTO: 0.5 %
BILIRUB SERPL-MCNC: 0.5 MG/DL (ref 0.1–2)
BUN BLD-MCNC: 13 MG/DL (ref 7–18)
CALCIUM BLD-MCNC: 8.7 MG/DL (ref 8.5–10.1)
CHLORIDE SERPL-SCNC: 107 MMOL/L (ref 98–112)
CO2 SERPL-SCNC: 26 MMOL/L (ref 21–32)
CREAT BLD-MCNC: 0.88 MG/DL
EOSINOPHIL # BLD AUTO: 0.2 X10(3) UL (ref 0–0.7)
EOSINOPHIL NFR BLD AUTO: 1.6 %
ERYTHROCYTE [DISTWIDTH] IN BLOOD BY AUTOMATED COUNT: 12.2 %
GFR SERPLBLD BASED ON 1.73 SQ M-ARVRAT: 84 ML/MIN/1.73M2 (ref 60–?)
GLOBULIN PLAS-MCNC: 3.4 G/DL (ref 2.8–4.4)
GLUCOSE BLD-MCNC: 95 MG/DL (ref 70–99)
HCT VFR BLD AUTO: 39.2 %
HGB BLD-MCNC: 13.2 G/DL
IMM GRANULOCYTES # BLD AUTO: 0.04 X10(3) UL (ref 0–1)
IMM GRANULOCYTES NFR BLD: 0.3 %
LDH SERPL L TO P-CCNC: 178 U/L
LYMPHOCYTES # BLD AUTO: 6.08 X10(3) UL (ref 1–4)
LYMPHOCYTES NFR BLD AUTO: 47.6 %
MCH RBC QN AUTO: 31.6 PG (ref 26–34)
MCHC RBC AUTO-ENTMCNC: 33.7 G/DL (ref 31–37)
MCV RBC AUTO: 93.8 FL
MONOCYTES # BLD AUTO: 1.07 X10(3) UL (ref 0.1–1)
MONOCYTES NFR BLD AUTO: 8.4 %
NEUTROPHILS # BLD AUTO: 5.33 X10 (3) UL (ref 1.5–7.7)
NEUTROPHILS # BLD AUTO: 5.33 X10(3) UL (ref 1.5–7.7)
NEUTROPHILS NFR BLD AUTO: 41.6 %
OSMOLALITY SERPL CALC.SUM OF ELEC: 286 MOSM/KG (ref 275–295)
PLATELET # BLD AUTO: 225 10(3)UL (ref 150–450)
POTASSIUM SERPL-SCNC: 4 MMOL/L (ref 3.5–5.1)
PROT SERPL-MCNC: 7.3 G/DL (ref 6.4–8.2)
RBC # BLD AUTO: 4.18 X10(6)UL
SODIUM SERPL-SCNC: 138 MMOL/L (ref 136–145)
WBC # BLD AUTO: 12.8 X10(3) UL (ref 4–11)

## 2022-09-28 PROCEDURE — 99214 OFFICE O/P EST MOD 30 MIN: CPT | Performed by: INTERNAL MEDICINE

## 2022-09-28 NOTE — PROGRESS NOTES
Patient is here for follow up for breast cancer on tamoxifen. She does have most hot flashes at night. She takes the medication before bed. She does experience fatigue but she is also very busy and somewhat stressed recently. She is eating well but worried about her weight. Her breast expanders are uncomfortable. She is scheduled for surgery with Dr. Lillie Serna in October to place implants. She had a surgical oncology breast exam last week.      Education Record    Learner:  Patient    Disease / Diagnosis: Breast cancer     Barriers / Limitations:  None   Comments:    Method:  Discussion   Comments:    General Topics:  Side effects and symptom management   Comments:    Outcome:  Shows understanding   Comments:

## 2022-10-13 ENCOUNTER — TELEPHONE (OUTPATIENT)
Dept: SURGERY | Facility: CLINIC | Age: 42
End: 2022-10-13

## 2022-10-13 NOTE — TELEPHONE ENCOUNTER
Patient LVM requesting call back. Spoke with patient and informed her she does not need to have her CBC and CMP re-drawn unless her PCP prefers to do so for her medical clearance for her upcoming procedure with Dr. Lobo Ruth. Valentine Glass stated she has a pre op appointment on 10/18/22 and will clarify with her PCP whether she should have her labs re-drawn. Patient verbalized understanding and was appreciative of call.

## 2022-10-18 ENCOUNTER — OFFICE VISIT (OUTPATIENT)
Dept: FAMILY MEDICINE CLINIC | Facility: CLINIC | Age: 42
End: 2022-10-18
Payer: COMMERCIAL

## 2022-10-18 ENCOUNTER — LAB ENCOUNTER (OUTPATIENT)
Dept: LAB | Age: 42
End: 2022-10-18
Attending: FAMILY MEDICINE
Payer: COMMERCIAL

## 2022-10-18 VITALS
BODY MASS INDEX: 27.63 KG/M2 | HEIGHT: 70 IN | RESPIRATION RATE: 16 BRPM | TEMPERATURE: 97 F | WEIGHT: 193 LBS | OXYGEN SATURATION: 98 % | HEART RATE: 74 BPM | SYSTOLIC BLOOD PRESSURE: 110 MMHG | DIASTOLIC BLOOD PRESSURE: 68 MMHG

## 2022-10-18 DIAGNOSIS — D72.9 NEUTROPHILIC LEUKOCYTOSIS: ICD-10-CM

## 2022-10-18 DIAGNOSIS — R94.31 ABNORMAL EKG: ICD-10-CM

## 2022-10-18 DIAGNOSIS — Z01.818 PRE-OP EXAM: ICD-10-CM

## 2022-10-18 DIAGNOSIS — I45.10 INCOMPLETE RBBB: ICD-10-CM

## 2022-10-18 DIAGNOSIS — Z28.21 INFLUENZA VACCINATION DECLINED BY PATIENT: ICD-10-CM

## 2022-10-18 DIAGNOSIS — Z01.818 PRE-OP EXAM: Primary | ICD-10-CM

## 2022-10-18 DIAGNOSIS — F33.1 MODERATE EPISODE OF RECURRENT MAJOR DEPRESSIVE DISORDER (HCC): ICD-10-CM

## 2022-10-18 LAB
ALBUMIN SERPL-MCNC: 3.7 G/DL (ref 3.4–5)
ALBUMIN/GLOB SERPL: 1 {RATIO} (ref 1–2)
ALP LIVER SERPL-CCNC: 64 U/L
ALT SERPL-CCNC: 23 U/L
ANION GAP SERPL CALC-SCNC: 2 MMOL/L (ref 0–18)
AST SERPL-CCNC: 15 U/L (ref 15–37)
BASOPHILS # BLD AUTO: 0.09 X10(3) UL (ref 0–0.2)
BASOPHILS NFR BLD AUTO: 0.8 %
BILIRUB SERPL-MCNC: 0.4 MG/DL (ref 0.1–2)
BUN BLD-MCNC: 13 MG/DL (ref 7–18)
CALCIUM BLD-MCNC: 8.6 MG/DL (ref 8.5–10.1)
CHLORIDE SERPL-SCNC: 109 MMOL/L (ref 98–112)
CO2 SERPL-SCNC: 29 MMOL/L (ref 21–32)
CREAT BLD-MCNC: 1.12 MG/DL
EOSINOPHIL # BLD AUTO: 0.23 X10(3) UL (ref 0–0.7)
EOSINOPHIL NFR BLD AUTO: 2 %
ERYTHROCYTE [DISTWIDTH] IN BLOOD BY AUTOMATED COUNT: 12.1 %
FASTING STATUS PATIENT QL REPORTED: NO
GFR SERPLBLD BASED ON 1.73 SQ M-ARVRAT: 63 ML/MIN/1.73M2 (ref 60–?)
GLOBULIN PLAS-MCNC: 3.6 G/DL (ref 2.8–4.4)
GLUCOSE BLD-MCNC: 95 MG/DL (ref 70–99)
HCT VFR BLD AUTO: 40.5 %
HGB BLD-MCNC: 13.1 G/DL
IMM GRANULOCYTES # BLD AUTO: 0.02 X10(3) UL (ref 0–1)
IMM GRANULOCYTES NFR BLD: 0.2 %
LYMPHOCYTES # BLD AUTO: 5.78 X10(3) UL (ref 1–4)
LYMPHOCYTES NFR BLD AUTO: 49.8 %
MCH RBC QN AUTO: 31.2 PG (ref 26–34)
MCHC RBC AUTO-ENTMCNC: 32.3 G/DL (ref 31–37)
MCV RBC AUTO: 96.4 FL
MONOCYTES # BLD AUTO: 0.81 X10(3) UL (ref 0.1–1)
MONOCYTES NFR BLD AUTO: 7 %
MORPHOLOGY: NORMAL
NEUTROPHILS # BLD AUTO: 4.67 X10 (3) UL (ref 1.5–7.7)
NEUTROPHILS # BLD AUTO: 4.67 X10(3) UL (ref 1.5–7.7)
NEUTROPHILS NFR BLD AUTO: 40.2 %
OSMOLALITY SERPL CALC.SUM OF ELEC: 290 MOSM/KG (ref 275–295)
PLATELET # BLD AUTO: 224 10(3)UL (ref 150–450)
PLATELET MORPHOLOGY: NORMAL
POTASSIUM SERPL-SCNC: 3.8 MMOL/L (ref 3.5–5.1)
PROT SERPL-MCNC: 7.3 G/DL (ref 6.4–8.2)
RBC # BLD AUTO: 4.2 X10(6)UL
SODIUM SERPL-SCNC: 140 MMOL/L (ref 136–145)
WBC # BLD AUTO: 11.6 X10(3) UL (ref 4–11)

## 2022-10-18 PROCEDURE — 36415 COLL VENOUS BLD VENIPUNCTURE: CPT

## 2022-10-18 PROCEDURE — 3074F SYST BP LT 130 MM HG: CPT | Performed by: FAMILY MEDICINE

## 2022-10-18 PROCEDURE — 85025 COMPLETE CBC W/AUTO DIFF WBC: CPT

## 2022-10-18 PROCEDURE — 80053 COMPREHEN METABOLIC PANEL: CPT

## 2022-10-18 PROCEDURE — 99243 OFF/OP CNSLTJ NEW/EST LOW 30: CPT | Performed by: FAMILY MEDICINE

## 2022-10-18 PROCEDURE — 3078F DIAST BP <80 MM HG: CPT | Performed by: FAMILY MEDICINE

## 2022-10-18 PROCEDURE — 3008F BODY MASS INDEX DOCD: CPT | Performed by: FAMILY MEDICINE

## 2022-10-21 ENCOUNTER — TELEPHONE (OUTPATIENT)
Dept: HEMATOLOGY/ONCOLOGY | Facility: HOSPITAL | Age: 42
End: 2022-10-21

## 2022-10-21 NOTE — TELEPHONE ENCOUNTER
Fariha Riggins called regarding patient recent labs and they came back abnormal. Dr Yessy Flores would like Dr Samia Morales to follow up with the patient. Thank you.

## 2022-10-21 NOTE — TELEPHONE ENCOUNTER
Dr. Nargis Killian reviewed the current and previous lab results and considers this to be stable. I left a message for the patient that she should keep her follow up with Dr. Nargis Killian in March.

## 2022-10-24 RX ORDER — ESCITALOPRAM OXALATE 10 MG/1
TABLET ORAL
Qty: 30 TABLET | Refills: 0 | Status: SHIPPED | OUTPATIENT
Start: 2022-10-24

## 2022-10-25 ENCOUNTER — LAB ENCOUNTER (OUTPATIENT)
Dept: LAB | Age: 42
End: 2022-10-25
Attending: SURGERY
Payer: COMMERCIAL

## 2022-10-25 ENCOUNTER — TELEPHONE (OUTPATIENT)
Dept: FAMILY MEDICINE CLINIC | Facility: CLINIC | Age: 42
End: 2022-10-25

## 2022-10-25 ENCOUNTER — TELEPHONE (OUTPATIENT)
Dept: SURGERY | Facility: CLINIC | Age: 42
End: 2022-10-25

## 2022-10-25 DIAGNOSIS — Z01.818 PREOP TESTING: ICD-10-CM

## 2022-10-25 RX ORDER — TAMOXIFEN CITRATE 20 MG/1
20 TABLET ORAL NIGHTLY
COMMUNITY
End: 2023-02-20

## 2022-10-26 LAB — SARS-COV-2 RNA RESP QL NAA+PROBE: NOT DETECTED

## 2022-10-26 NOTE — TELEPHONE ENCOUNTER
Spoke with patient and answered her perioperative medication questions. Patient also requested a work note and letter was sent via [employer]. Patient had no further questions and was appreciative of call.

## 2022-10-27 ENCOUNTER — HOSPITAL ENCOUNTER (OUTPATIENT)
Facility: HOSPITAL | Age: 42
Setting detail: HOSPITAL OUTPATIENT SURGERY
Discharge: HOME | End: 2022-10-27
Attending: SURGERY | Admitting: SURGERY
Payer: COMMERCIAL

## 2022-10-27 ENCOUNTER — ANESTHESIA (OUTPATIENT)
Dept: SURGERY | Facility: HOSPITAL | Age: 42
End: 2022-10-27
Payer: COMMERCIAL

## 2022-10-27 ENCOUNTER — ANESTHESIA EVENT (OUTPATIENT)
Dept: SURGERY | Facility: HOSPITAL | Age: 42
End: 2022-10-27
Payer: COMMERCIAL

## 2022-10-27 VITALS
HEIGHT: 70 IN | DIASTOLIC BLOOD PRESSURE: 62 MMHG | OXYGEN SATURATION: 95 % | HEART RATE: 79 BPM | TEMPERATURE: 100 F | BODY MASS INDEX: 27.2 KG/M2 | SYSTOLIC BLOOD PRESSURE: 118 MMHG | WEIGHT: 190 LBS | RESPIRATION RATE: 16 BRPM

## 2022-10-27 DIAGNOSIS — Z01.818 PREOP TESTING: Primary | ICD-10-CM

## 2022-10-27 DIAGNOSIS — Z90.13 ABSENCE OF BREAST, BILATERAL: ICD-10-CM

## 2022-10-27 LAB — B-HCG UR QL: NEGATIVE

## 2022-10-27 PROCEDURE — 81025 URINE PREGNANCY TEST: CPT

## 2022-10-27 PROCEDURE — 0HRV07Z REPLACEMENT OF BILATERAL BREAST WITH AUTOLOGOUS TISSUE SUBSTITUTE, OPEN APPROACH: ICD-10-PCS | Performed by: SURGERY

## 2022-10-27 PROCEDURE — 0HPT0NZ REMOVAL OF TISSUE EXPANDER FROM RIGHT BREAST, OPEN APPROACH: ICD-10-PCS | Performed by: SURGERY

## 2022-10-27 PROCEDURE — 88305 TISSUE EXAM BY PATHOLOGIST: CPT | Performed by: SURGERY

## 2022-10-27 PROCEDURE — 88300 SURGICAL PATH GROSS: CPT | Performed by: SURGERY

## 2022-10-27 PROCEDURE — 0HPU0NZ REMOVAL OF TISSUE EXPANDER FROM LEFT BREAST, OPEN APPROACH: ICD-10-PCS | Performed by: SURGERY

## 2022-10-27 PROCEDURE — 0JD83ZZ EXTRACTION OF ABDOMEN SUBCUTANEOUS TISSUE AND FASCIA, PERCUTANEOUS APPROACH: ICD-10-PCS | Performed by: SURGERY

## 2022-10-27 DEVICE — IMPLNT BRST INSPIRA FULL 605CC: Type: IMPLANTABLE DEVICE | Site: BREAST | Status: FUNCTIONAL

## 2022-10-27 RX ORDER — DEXAMETHASONE SODIUM PHOSPHATE 4 MG/ML
VIAL (ML) INJECTION AS NEEDED
Status: DISCONTINUED | OUTPATIENT
Start: 2022-10-27 | End: 2022-10-27 | Stop reason: SURG

## 2022-10-27 RX ORDER — HYDROMORPHONE HYDROCHLORIDE 1 MG/ML
0.2 INJECTION, SOLUTION INTRAMUSCULAR; INTRAVENOUS; SUBCUTANEOUS EVERY 5 MIN PRN
Status: DISCONTINUED | OUTPATIENT
Start: 2022-10-27 | End: 2022-10-27

## 2022-10-27 RX ORDER — DOCUSATE SODIUM 100 MG/1
100 CAPSULE, LIQUID FILLED ORAL 2 TIMES DAILY
Qty: 30 CAPSULE | Refills: 1 | Status: SHIPPED | OUTPATIENT
Start: 2022-10-27

## 2022-10-27 RX ORDER — EPHEDRINE SULFATE 50 MG/ML
INJECTION, SOLUTION INTRAVENOUS AS NEEDED
Status: DISCONTINUED | OUTPATIENT
Start: 2022-10-27 | End: 2022-10-27 | Stop reason: SURG

## 2022-10-27 RX ORDER — MORPHINE SULFATE 10 MG/ML
6 INJECTION, SOLUTION INTRAMUSCULAR; INTRAVENOUS EVERY 10 MIN PRN
Status: DISCONTINUED | OUTPATIENT
Start: 2022-10-27 | End: 2022-10-27

## 2022-10-27 RX ORDER — HYDROMORPHONE HYDROCHLORIDE 1 MG/ML
0.4 INJECTION, SOLUTION INTRAMUSCULAR; INTRAVENOUS; SUBCUTANEOUS EVERY 5 MIN PRN
Status: DISCONTINUED | OUTPATIENT
Start: 2022-10-27 | End: 2022-10-27

## 2022-10-27 RX ORDER — CEFAZOLIN SODIUM/WATER 2 G/20 ML
2 SYRINGE (ML) INTRAVENOUS ONCE
Status: COMPLETED | OUTPATIENT
Start: 2022-10-27 | End: 2022-10-27

## 2022-10-27 RX ORDER — LIDOCAINE HYDROCHLORIDE AND EPINEPHRINE 10; 10 MG/ML; UG/ML
INJECTION, SOLUTION INFILTRATION; PERINEURAL AS NEEDED
Status: DISCONTINUED | OUTPATIENT
Start: 2022-10-27 | End: 2022-10-27 | Stop reason: HOSPADM

## 2022-10-27 RX ORDER — ONDANSETRON 4 MG/1
4 TABLET, FILM COATED ORAL EVERY 8 HOURS PRN
Qty: 12 TABLET | Refills: 0 | Status: SHIPPED | OUTPATIENT
Start: 2022-10-27

## 2022-10-27 RX ORDER — LIDOCAINE HYDROCHLORIDE 10 MG/ML
INJECTION, SOLUTION EPIDURAL; INFILTRATION; INTRACAUDAL; PERINEURAL AS NEEDED
Status: DISCONTINUED | OUTPATIENT
Start: 2022-10-27 | End: 2022-10-27 | Stop reason: SURG

## 2022-10-27 RX ORDER — CEPHALEXIN 500 MG/1
500 CAPSULE ORAL 4 TIMES DAILY
Qty: 20 CAPSULE | Refills: 0 | Status: SHIPPED | OUTPATIENT
Start: 2022-10-27 | End: 2022-11-01

## 2022-10-27 RX ORDER — HYDROMORPHONE HYDROCHLORIDE 1 MG/ML
0.6 INJECTION, SOLUTION INTRAMUSCULAR; INTRAVENOUS; SUBCUTANEOUS EVERY 5 MIN PRN
Status: DISCONTINUED | OUTPATIENT
Start: 2022-10-27 | End: 2022-10-27

## 2022-10-27 RX ORDER — HYDROCODONE BITARTRATE AND ACETAMINOPHEN 5; 325 MG/1; MG/1
1-2 TABLET ORAL EVERY 4 HOURS PRN
Qty: 20 TABLET | Refills: 0 | Status: SHIPPED | OUTPATIENT
Start: 2022-10-27

## 2022-10-27 RX ORDER — SCOLOPAMINE TRANSDERMAL SYSTEM 1 MG/1
1 PATCH, EXTENDED RELEASE TRANSDERMAL ONCE
Status: DISCONTINUED | OUTPATIENT
Start: 2022-10-27 | End: 2022-10-27

## 2022-10-27 RX ORDER — SODIUM CHLORIDE, SODIUM LACTATE, POTASSIUM CHLORIDE, CALCIUM CHLORIDE 600; 310; 30; 20 MG/100ML; MG/100ML; MG/100ML; MG/100ML
INJECTION, SOLUTION INTRAVENOUS CONTINUOUS
Status: DISCONTINUED | OUTPATIENT
Start: 2022-10-27 | End: 2022-10-27

## 2022-10-27 RX ORDER — ACETAMINOPHEN 650 MG
TABLET, EXTENDED RELEASE ORAL AS NEEDED
Status: DISCONTINUED | OUTPATIENT
Start: 2022-10-27 | End: 2022-10-27 | Stop reason: HOSPADM

## 2022-10-27 RX ORDER — METOCLOPRAMIDE HYDROCHLORIDE 5 MG/ML
INJECTION INTRAMUSCULAR; INTRAVENOUS AS NEEDED
Status: DISCONTINUED | OUTPATIENT
Start: 2022-10-27 | End: 2022-10-27 | Stop reason: SURG

## 2022-10-27 RX ORDER — KETAMINE HYDROCHLORIDE 50 MG/ML
INJECTION, SOLUTION, CONCENTRATE INTRAMUSCULAR; INTRAVENOUS AS NEEDED
Status: DISCONTINUED | OUTPATIENT
Start: 2022-10-27 | End: 2022-10-27 | Stop reason: SURG

## 2022-10-27 RX ORDER — HYDROCODONE BITARTRATE AND ACETAMINOPHEN 5; 325 MG/1; MG/1
1 TABLET ORAL AS NEEDED
Status: DISCONTINUED | OUTPATIENT
Start: 2022-10-27 | End: 2022-10-27

## 2022-10-27 RX ORDER — ROCURONIUM BROMIDE 10 MG/ML
INJECTION, SOLUTION INTRAVENOUS AS NEEDED
Status: DISCONTINUED | OUTPATIENT
Start: 2022-10-27 | End: 2022-10-27 | Stop reason: SURG

## 2022-10-27 RX ORDER — ACETAMINOPHEN 500 MG
1000 TABLET ORAL ONCE
Status: COMPLETED | OUTPATIENT
Start: 2022-10-27 | End: 2022-10-27

## 2022-10-27 RX ORDER — MORPHINE SULFATE 4 MG/ML
4 INJECTION, SOLUTION INTRAMUSCULAR; INTRAVENOUS EVERY 10 MIN PRN
Status: DISCONTINUED | OUTPATIENT
Start: 2022-10-27 | End: 2022-10-27

## 2022-10-27 RX ORDER — MIDAZOLAM HYDROCHLORIDE 1 MG/ML
INJECTION INTRAMUSCULAR; INTRAVENOUS AS NEEDED
Status: DISCONTINUED | OUTPATIENT
Start: 2022-10-27 | End: 2022-10-27 | Stop reason: SURG

## 2022-10-27 RX ORDER — DIPHENHYDRAMINE HYDROCHLORIDE 50 MG/ML
INJECTION INTRAMUSCULAR; INTRAVENOUS AS NEEDED
Status: DISCONTINUED | OUTPATIENT
Start: 2022-10-27 | End: 2022-10-27 | Stop reason: SURG

## 2022-10-27 RX ORDER — ONDANSETRON 2 MG/ML
INJECTION INTRAMUSCULAR; INTRAVENOUS AS NEEDED
Status: DISCONTINUED | OUTPATIENT
Start: 2022-10-27 | End: 2022-10-27 | Stop reason: SURG

## 2022-10-27 RX ORDER — NALOXONE HYDROCHLORIDE 0.4 MG/ML
80 INJECTION, SOLUTION INTRAMUSCULAR; INTRAVENOUS; SUBCUTANEOUS AS NEEDED
Status: DISCONTINUED | OUTPATIENT
Start: 2022-10-27 | End: 2022-10-27

## 2022-10-27 RX ORDER — MORPHINE SULFATE 4 MG/ML
2 INJECTION, SOLUTION INTRAMUSCULAR; INTRAVENOUS EVERY 10 MIN PRN
Status: DISCONTINUED | OUTPATIENT
Start: 2022-10-27 | End: 2022-10-27

## 2022-10-27 RX ADMIN — KETAMINE HYDROCHLORIDE 50 MG: 50 INJECTION, SOLUTION, CONCENTRATE INTRAMUSCULAR; INTRAVENOUS at 08:17:00

## 2022-10-27 RX ADMIN — EPHEDRINE SULFATE 5 MG: 50 INJECTION, SOLUTION INTRAVENOUS at 09:53:00

## 2022-10-27 RX ADMIN — METOCLOPRAMIDE HYDROCHLORIDE 10 MG: 5 INJECTION INTRAMUSCULAR; INTRAVENOUS at 07:55:00

## 2022-10-27 RX ADMIN — ONDANSETRON 4 MG: 2 INJECTION INTRAMUSCULAR; INTRAVENOUS at 10:00:00

## 2022-10-27 RX ADMIN — ROCURONIUM BROMIDE 10 MG: 10 INJECTION, SOLUTION INTRAVENOUS at 08:26:00

## 2022-10-27 RX ADMIN — MIDAZOLAM HYDROCHLORIDE 2 MG: 1 INJECTION INTRAMUSCULAR; INTRAVENOUS at 07:40:00

## 2022-10-27 RX ADMIN — ROCURONIUM BROMIDE 5 MG: 10 INJECTION, SOLUTION INTRAVENOUS at 08:56:00

## 2022-10-27 RX ADMIN — SODIUM CHLORIDE, SODIUM LACTATE, POTASSIUM CHLORIDE, CALCIUM CHLORIDE: 600; 310; 30; 20 INJECTION, SOLUTION INTRAVENOUS at 10:19:00

## 2022-10-27 RX ADMIN — SODIUM CHLORIDE, SODIUM LACTATE, POTASSIUM CHLORIDE, CALCIUM CHLORIDE: 600; 310; 30; 20 INJECTION, SOLUTION INTRAVENOUS at 07:41:00

## 2022-10-27 RX ADMIN — KETAMINE HYDROCHLORIDE 5 MG: 50 INJECTION, SOLUTION, CONCENTRATE INTRAMUSCULAR; INTRAVENOUS at 08:07:00

## 2022-10-27 RX ADMIN — DEXAMETHASONE SODIUM PHOSPHATE 8 MG: 4 MG/ML VIAL (ML) INJECTION at 07:55:00

## 2022-10-27 RX ADMIN — ROCURONIUM BROMIDE 35 MG: 10 INJECTION, SOLUTION INTRAVENOUS at 07:47:00

## 2022-10-27 RX ADMIN — SODIUM CHLORIDE, SODIUM LACTATE, POTASSIUM CHLORIDE, CALCIUM CHLORIDE: 600; 310; 30; 20 INJECTION, SOLUTION INTRAVENOUS at 09:39:00

## 2022-10-27 RX ADMIN — KETAMINE HYDROCHLORIDE 10 MG: 50 INJECTION, SOLUTION, CONCENTRATE INTRAMUSCULAR; INTRAVENOUS at 08:56:00

## 2022-10-27 RX ADMIN — KETAMINE HYDROCHLORIDE 20 MG: 50 INJECTION, SOLUTION, CONCENTRATE INTRAMUSCULAR; INTRAVENOUS at 08:04:00

## 2022-10-27 RX ADMIN — EPHEDRINE SULFATE 5 MG: 50 INJECTION, SOLUTION INTRAVENOUS at 09:38:00

## 2022-10-27 RX ADMIN — DIPHENHYDRAMINE HYDROCHLORIDE 12.5 MG: 50 INJECTION INTRAMUSCULAR; INTRAVENOUS at 07:41:00

## 2022-10-27 RX ADMIN — CEFAZOLIN SODIUM/WATER 2 G: 2 G/20 ML SYRINGE (ML) INTRAVENOUS at 07:43:00

## 2022-10-27 RX ADMIN — LIDOCAINE HYDROCHLORIDE 50 MG: 10 INJECTION, SOLUTION EPIDURAL; INFILTRATION; INTRACAUDAL; PERINEURAL at 07:47:00

## 2022-10-27 NOTE — OPERATIVE REPORT
[facility]    PATIENT'S NAME: Gumaro PARISH   ATTENDING PHYSICIAN: Ulysses Wood MD   OPERATING PHYSICIAN: Ulysses Wood MD   PATIENT ACCOUNT#:   [id number]    LOCATION:  [facility] [address] PACU 2 EMHP 10  MEDICAL RECORD #:   [id number]       YOB: 1980  ADMISSION DATE:       10/27/2022      OPERATION DATE:  10/27/2022    OPERATIVE REPORT      PREOPERATIVE DIAGNOSIS:  History of bilateral mastectomy and tissue expander reconstruction. POSTOPERATIVE DIAGNOSIS:  History of bilateral mastectomy and tissue expander reconstruction. PROCEDURE:    1. Removal of bilateral breast tissue expander. 2.   Bilateral breast capsulorrhaphy. 3.   Placement of silicone gel implants, bilateral breasts. 4.   Autologous fat grafting to bilateral reconstructed breasts. ASSISTANT:  Jacob Middleton SA.    ANESTHESIA:  General.    ESTIMATED BLOOD LOSS:  10 mL. COMPLICATIONS:  None. FINDINGS:  Bilateral breast reconstruction from Natrelle Inspira soft touch breast implant, style SSF, reference SF-605; on the right, serial number [id number]; on the left, serial number [id number]. INDICATIONS:  The patient is a 48-year-old female who previously underwent bilateral mastectomy and partial submuscular tissue expander reconstruction. She completed uncomplicated expansion and now presents for exchange, permanent implants and fat grafting. OPERATIVE TECHNIQUE:  Informed was obtained from the patient. The risks, benefits, and alternatives were reviewed with the patient preoperatively. She expressed understanding and wished to proceed. The patient was marked in the preoperative holding area in the upright position. The midline and inframammary folds were marked. Areas to be fat grafted were marked. Bilateral breast scars were encompassed in ellipses. Finally, areas of upper abdominal and flank lipodystrophy were marked for liposuction.   The patient was taken to the operating room, properly identified, placed in the supine position. Sequential compression devices were placed on bilateral lower extremities. Intravenous antibiotic prophylaxis was administered. The patient then underwent successful induction of general anesthesia and endotracheal intubation. The arms were placed abducted on foam-padded armboards and loosely secured with Kerlix. The chest and abdomen were prepped and draped sterilely. The proposed liposuction port sites in the lateral abdomen were infiltrated with 1% lidocaine with epinephrine. Stab incisions were then created and 1 L of tumescent solution was infiltrated into the flanks and upper abdomen. Next, bilateral breast scars were infiltrated with 1% lidocaine with epinephrine. The scars were excised and sent for permanent pathologic analysis as right and left breast scars. The procedure began on the right side. A superior subcutaneous flap was elevated with electrocautery. The capsule was then entered at the junction of the acellular dermal matrix and the muscle. The tissue expander was encountered and found to be intact. It was punctured, aspirated, and removed. The pocket was inspected. Complete incorporation of the acellular dermal matrix was noted. There were no visible or palpable nodules noted. There was no periprosthetic fluid noted. Sizers were placed. Significant lateral displacement of the implant was noted. With the appropriate sizer in place, the proposal lateral capsulorrhaphy markings were marked externally and then transposed internally. A lateral capsular plication was then performed with interrupted and then running 2-0 Vicryl sutures. Once the pocket had been adequately corrected, attention was turned to the left breast.  In a similar fashion, a superior subcutaneous flap was elevated with electrocautery. A transverse capsulotomy was then created at the junction of acellular dermal matrix and the muscle.   The tissue expander was encountered and found to be intact. It was punctured, aspirated, and removed. The pocket was inspected. Complete incorporation of the acellular dermal matrix was noted. There are no visible or palpable nodules noted. There was no periprosthetic fluid noted. Again, significant lateral displacement of the implant was noted and a similar lateral capsulorrhaphy was performed with 2 layers of interrupted and running 2-0 Vicryl sutures. Once the pocket had been adequately corrected, the submuscular pocket was released bilaterally with electrocautery. Sizers were then placed. Attention was then turned to the abdomen. Using a 4 and 5 mm Mercedes tip cannula, power-assisted liposuction of the upper abdomen and flanks was performed. Approximately 350 mL of lipoaspirate were collected using the [employer] System. The fat grafting port sites were closed with interrupted 3-0 Vicryl deep dermal sutures. The fat was prepared in the usual fashion. With the patient in the upright position and sizers in place, fat was grafted to bilateral breast; 35 mL was grafted to the right breast and 40 mL was grafted to the left breast.  Next, sizers of different volumes and projections were placed. Ultimately, it appeared that the style  mL implant gave the best size and shape in accordance with the patient's desires. Both pockets were rinsed with Betadine and then antibiotic irrigation until clear. Hemostasis was checked and noted to be adequate. The surgical sites were isolated with Ioban and gloves were changed. The implants were brought on the field and immediately bathed in antibiotic irrigation. They were checked for integrity, noted to be intact. The implants were placed in the submuscular pocket taking care to maintain their proper orientation. The capsules were closed with running 2-0 Vicryl sutures bilaterally.   The deep dermis was reapproximated with interrupted 3-0 Vicryl deep dermal suture and running 4-0 Monocryl subcuticular suture. Exofin and Steri-Strips were placed on the incisions. Fluff gauze and a surgical bra were placed on the breast.  TopiFoam and an abdominal binder were placed on the abdomen. The patient was awakened, extubated, taken to Recovery in stable condition. There were no operative complications. All needle, sponge, and instrument counts were correct at the end of procedure. Dictated By Daljit Serna MD  d: 10/27/2022 11:01:43  t: 10/27/2022 11:24:11  [facility] [id number]  [facility]/

## 2022-10-27 NOTE — H&P
No change in Dr. Danie Pritchard H&P from 10/18. WE reviewed the plan for removal of bilateral breast tissue expanders, placement of smooth, round, silicone implants, and fat grafting to bilateral reconstructed breasts. The nature of the procedure was reviewed with the patient and . We discussed the risks of surgery including but not limited to bleeding, infection, scarring, delayed wound healing, asymmetry, implant infection or extrusion requiring removal, ALCL, capsular contracture, hypertrophic scarring or keloid, injury to intra-abdominal structures, contour abnormalities, cysts or calcifications requiring biopsy, and need for further surgery. We reviewed the expected postoperative course including possible need for drains, as well as need for activity limitation and compression. Multiple questions were answered the patient's satisfaction. No guarantees as to outcome were offered.   The patient expresses understanding and wishes to proceed

## 2022-10-27 NOTE — BRIEF OP NOTE
Pre-Operative Diagnosis: Absence of breast, bilateral [Z90.13]     Post-Operative Diagnosis: Absence of breast, bilateral [Z90.13]      Procedure Performed:   Removal of bilateral breast tissue expanders and placement of permanent implants with autologous fat grafting    Surgeon(s) and Role:     Lianet Mahoney MD - Primary    Assistant(s):  Surgical Assistant.: Mace Bearded     Surgical Findings: See dictation     Specimen: b/l breast scar     Estimated Blood Loss: Blood Output: 10 mL (10/27/2022  9:58 AM)          Jonathan Harley MD  10/27/2022  10:50 AM

## 2022-10-27 NOTE — ANESTHESIA PROCEDURE NOTES
Airway  Date/Time: 10/27/2022 7:50 AM  Urgency: Elective      General Information and Staff    Patient location during procedure: OR  Anesthesiologist: Lan Patel DO  Resident/CRNA: Danitza Harris CRNA  Performed: CRNA     Indications and Patient Condition  Indications for airway management: anesthesia  Sedation level: deep  Preoxygenated: yes  Patient position: sniffing  Mask difficulty assessment: 1 - vent by mask    Final Airway Details  Final airway type: endotracheal airway      Successful airway: ETT  Cuffed: yes   Successful intubation technique: direct laryngoscopy  Endotracheal tube insertion site: oral  Blade: Tracy  Blade size: #3  ETT size (mm): 7.5    Cormack-Lehane Classification: grade IIA - partial view of glottis  Placement verified by: chest auscultation and capnometry   Measured from: lips  ETT to lips (cm): 21  Number of attempts at approach: 1    Additional Comments  Dental exam unchanged from pre op  Soft guaze bite block between molars

## 2022-11-03 ENCOUNTER — NURSE ONLY (OUTPATIENT)
Dept: SURGERY | Facility: CLINIC | Age: 42
End: 2022-11-03
Payer: COMMERCIAL

## 2022-11-03 ENCOUNTER — TELEPHONE (OUTPATIENT)
Dept: HEMATOLOGY/ONCOLOGY | Facility: HOSPITAL | Age: 42
End: 2022-11-03

## 2022-11-03 PROCEDURE — 99024 POSTOP FOLLOW-UP VISIT: CPT | Performed by: SURGERY

## 2022-11-03 NOTE — PROGRESS NOTES
Liam Lopez is a 36year old female who presents today for a follow-up after removal of bilateral breast tissue expander, bilateral breast capsulorrhaphy, placement of bilateral silicone gel implants, and autologous fat grafting to the bilateral breasts on 10/27/22 by Dr. Tylor Clancy. She denies fever and chills. She denies nausea, vomiting, diarrhea or constipation. Her pain is controlled. Physical Exam     Surgical incisions are clean, dry, and intact. No erythema, no wound drainage. Breasts: Bilateral breasts are without erythema or hematoma. Mild resolving ecchymosis. Abdomen: abdomen soft. Mild tenderness consistent with fat grafting. There were no vitals filed for this visit. Assessment and Plan     Liam Lopez is doing well s/p removal of bilateral breast tissue expander, bilateral breast capsulorrhaphy, placement of bilateral silicone gel implants, and autologous fat grafting to the bilateral breasts on 10/27/22 by Dr. Tylor Clancy. Bilateral breast steri-strips removed. Incision is healing well. Incision was cleaned with alcohol and steri-strips were replaced. The abdominal fat grafting steri-strips were removed. Fat grafting sites are healing well. Left open to air. Patient stated her abdominal binder was uncomfortable and she was instructed to discontinue. She was instructed she can wear Spanx shorts or a tight camisole for comfort. Patient stated she is having trouble sleeping and will occasionally take a Norco for sleep aide. We discussed switching to Tylenol PM.    Patient will follow up with Dr. Sheryl Jules on 11/22/22 for wound check. Questions were answered. Patient understands.      Lawson Nieves RN  11/3/2022  12:04 PM

## 2022-11-03 NOTE — TELEPHONE ENCOUNTER
Patient is calling because she had to stop taking her Tamoxifen 20 Mg due to having a surgery and she is wondering when should she start back taking it.  Call back number is [phone]

## 2022-11-16 LAB
ATRIAL RATE: 68 BPM
P AXIS: 62 DEGREES
P-R INTERVAL: 140 MS
Q-T INTERVAL: 432 MS
QRS DURATION: 98 MS
QTC CALCULATION (BEZET): 459 MS
R AXIS: 56 DEGREES
T AXIS: 9 DEGREES
VENTRICULAR RATE: 68 BPM

## 2022-11-21 NOTE — PROGRESS NOTES
Elizabeth Mensah is a 36year old female who presents today for a follow-up after undergoing exchange of permanent implants on 10/27. She denies fever and chills. She denies nausea, vomiting, diarrhea or constipation. Physical Examination:  Breasts: Bilateral breast incisions are clean dry and intact. Acceptable shape and symmetry is noted. Abdominal incisions are clean dry and intact. Assessment and Plan:  Patient is doing well. She may slowly resume regular activity with compression in place. We discussed scar care including massage with moisturizer and silicone products. The patient will follow up in 3-6 months for scar check.

## 2022-11-22 ENCOUNTER — OFFICE VISIT (OUTPATIENT)
Dept: SURGERY | Facility: CLINIC | Age: 42
End: 2022-11-22
Payer: COMMERCIAL

## 2022-11-22 DIAGNOSIS — Z90.13 S/P BILATERAL MASTECTOMY: Primary | ICD-10-CM

## 2022-11-22 PROCEDURE — 99024 POSTOP FOLLOW-UP VISIT: CPT | Performed by: SURGERY

## 2022-12-12 NOTE — TELEPHONE ENCOUNTER
Requesting Escitalopram 10mg  LOV: 10/18/22  RTC: not noted  Last Relevant Labs: 10/18/22  Filled: 10/24/22 #30 with 0 refills    Future Appointments   Date Time Provider Gordy Zully   2/24/2023  1:30 PM Luciano Ross MD [facility]   3/29/2023  3:45 PM Joni Oliver MD [address]     Non-protocol med:  Rx pended and routed for approval/denial

## 2022-12-13 RX ORDER — ESCITALOPRAM OXALATE 10 MG/1
10 TABLET ORAL DAILY
Qty: 90 TABLET | Refills: 0 | Status: SHIPPED | OUTPATIENT
Start: 2022-12-13

## 2023-02-20 DIAGNOSIS — C50.811 MALIGNANT NEOPLASM OF OVERLAPPING SITES OF RIGHT BREAST IN FEMALE, ESTROGEN RECEPTOR POSITIVE (HCC): Primary | ICD-10-CM

## 2023-02-20 DIAGNOSIS — Z17.0 MALIGNANT NEOPLASM OF OVERLAPPING SITES OF RIGHT BREAST IN FEMALE, ESTROGEN RECEPTOR POSITIVE (HCC): Primary | ICD-10-CM

## 2023-02-20 RX ORDER — TAMOXIFEN CITRATE 20 MG/1
20 TABLET ORAL NIGHTLY
Qty: 90 TABLET | Refills: 3 | Status: SHIPPED | OUTPATIENT
Start: 2023-02-20

## 2023-03-29 ENCOUNTER — OFFICE VISIT (OUTPATIENT)
Dept: HEMATOLOGY/ONCOLOGY | Facility: HOSPITAL | Age: 43
End: 2023-03-29
Attending: GENETIC COUNSELOR, MS
Payer: COMMERCIAL

## 2023-03-29 VITALS
RESPIRATION RATE: 18 BRPM | BODY MASS INDEX: 28.77 KG/M2 | OXYGEN SATURATION: 99 % | SYSTOLIC BLOOD PRESSURE: 130 MMHG | HEIGHT: 70 IN | DIASTOLIC BLOOD PRESSURE: 82 MMHG | HEART RATE: 74 BPM | WEIGHT: 201 LBS | TEMPERATURE: 97 F

## 2023-03-29 DIAGNOSIS — Z17.0 MALIGNANT NEOPLASM OF OVERLAPPING SITES OF RIGHT BREAST IN FEMALE, ESTROGEN RECEPTOR POSITIVE (HCC): ICD-10-CM

## 2023-03-29 DIAGNOSIS — C50.811 MALIGNANT NEOPLASM OF OVERLAPPING SITES OF RIGHT BREAST IN FEMALE, ESTROGEN RECEPTOR POSITIVE (HCC): ICD-10-CM

## 2023-03-29 LAB
ALBUMIN SERPL-MCNC: 4.1 G/DL (ref 3.4–5)
ALBUMIN/GLOB SERPL: 1.2 {RATIO} (ref 1–2)
ALP LIVER SERPL-CCNC: 63 U/L
ALT SERPL-CCNC: 29 U/L
ANION GAP SERPL CALC-SCNC: 2 MMOL/L (ref 0–18)
AST SERPL-CCNC: 19 U/L (ref 15–37)
BASOPHILS # BLD AUTO: 0.07 X10(3) UL (ref 0–0.2)
BASOPHILS NFR BLD AUTO: 0.5 %
BILIRUB SERPL-MCNC: 0.5 MG/DL (ref 0.1–2)
BUN BLD-MCNC: 12 MG/DL (ref 7–18)
CALCIUM BLD-MCNC: 8.7 MG/DL (ref 8.5–10.1)
CHLORIDE SERPL-SCNC: 106 MMOL/L (ref 98–112)
CO2 SERPL-SCNC: 29 MMOL/L (ref 21–32)
CREAT BLD-MCNC: 0.79 MG/DL
EOSINOPHIL # BLD AUTO: 0.22 X10(3) UL (ref 0–0.7)
EOSINOPHIL NFR BLD AUTO: 1.5 %
ERYTHROCYTE [DISTWIDTH] IN BLOOD BY AUTOMATED COUNT: 11.9 %
FASTING STATUS PATIENT QL REPORTED: NO
GFR SERPLBLD BASED ON 1.73 SQ M-ARVRAT: 95 ML/MIN/1.73M2 (ref 60–?)
GLOBULIN PLAS-MCNC: 3.4 G/DL (ref 2.8–4.4)
GLUCOSE BLD-MCNC: 109 MG/DL (ref 70–99)
HCT VFR BLD AUTO: 41.2 %
HGB BLD-MCNC: 13.5 G/DL
IMM GRANULOCYTES # BLD AUTO: 0.05 X10(3) UL (ref 0–1)
IMM GRANULOCYTES NFR BLD: 0.3 %
LDH SERPL L TO P-CCNC: 178 U/L
LYMPHOCYTES # BLD AUTO: 6.62 X10(3) UL (ref 1–4)
LYMPHOCYTES NFR BLD AUTO: 44.9 %
MCH RBC QN AUTO: 31.3 PG (ref 26–34)
MCHC RBC AUTO-ENTMCNC: 32.8 G/DL (ref 31–37)
MCV RBC AUTO: 95.4 FL
MONOCYTES # BLD AUTO: 0.96 X10(3) UL (ref 0.1–1)
MONOCYTES NFR BLD AUTO: 6.5 %
NEUTROPHILS # BLD AUTO: 6.82 X10 (3) UL (ref 1.5–7.7)
NEUTROPHILS # BLD AUTO: 6.82 X10(3) UL (ref 1.5–7.7)
NEUTROPHILS NFR BLD AUTO: 46.3 %
OSMOLALITY SERPL CALC.SUM OF ELEC: 284 MOSM/KG (ref 275–295)
PLATELET # BLD AUTO: 226 10(3)UL (ref 150–450)
POTASSIUM SERPL-SCNC: 3.7 MMOL/L (ref 3.5–5.1)
PROT SERPL-MCNC: 7.5 G/DL (ref 6.4–8.2)
RBC # BLD AUTO: 4.32 X10(6)UL
SODIUM SERPL-SCNC: 137 MMOL/L (ref 136–145)
WBC # BLD AUTO: 14.7 X10(3) UL (ref 4–11)

## 2023-03-29 PROCEDURE — 99214 OFFICE O/P EST MOD 30 MIN: CPT | Performed by: INTERNAL MEDICINE

## 2023-03-29 NOTE — PROGRESS NOTES
Patient is here for follow up for breast cancer on tamoxifen. She has some tingling in her right arm that is intermittent. She does not notice any swelling. She takes tamoxifen at night and she does wake up feeling very hot. She does not have hot flashes during the day. She is concerned about weight gain. Answered questions about how tamoxifen works.       Education Record    Learner:  Patient    Disease / Diagnosis: breast cancer    Barriers / Limitations:  None   Comments:    Method:  Discussion   Comments:    General Topics:  Side effects and symptom management   Comments:    Outcome:  Shows understanding   Comments:

## 2023-04-11 RX ORDER — ESCITALOPRAM OXALATE 10 MG/1
TABLET ORAL
Qty: 90 TABLET | Refills: 0 | Status: SHIPPED | OUTPATIENT
Start: 2023-04-11

## 2023-04-25 ENCOUNTER — OFFICE VISIT (OUTPATIENT)
Dept: SURGERY | Facility: CLINIC | Age: 43
End: 2023-04-25
Payer: COMMERCIAL

## 2023-04-25 DIAGNOSIS — Z90.13 S/P BILATERAL MASTECTOMY: Primary | ICD-10-CM

## 2023-04-25 PROCEDURE — 99212 OFFICE O/P EST SF 10 MIN: CPT | Performed by: SURGERY

## 2023-04-25 NOTE — PATIENT INSTRUCTIONS
Surgeon:         Dr. Lyndsey Boston                                        Tel:         [phone]                                  Fax:        [phone]    Surgery/Procedure:       Autologous fat grafting to the bilateral reconstructed breasts. 1.5 hours, general anesthesia, outpatient                                     Hospital:  [facility]: [address] [place] [address] [address], [place], [address]           [phone]  [facility]: [address], [place]               [phone]    1. Someone will need to drive you to and from the hospital if your procedure is outpatient. 2.Do not drink alcohol or smoke 24 hours prior to your procedure. 3. Bring a picture ID and your insurance card. 4. You will be contacted by the hospital the day before to confirm the procedure time and location. 5. The hospital will also contact you approximately one week before surgery to schedule your COVID test.     6. Do not take any herbal supplements or blood thinners at least one week before your procedure/surgery. This includes NSAID's (aspirin, baby aspirin, Motrin, Ibuprofen, Aleve, Advil, Naproxen, etc), Plavix, fish oil, vitamin E, turmeric, CoQ10, or green tea supplements, etc. *TYLENOL or acetaminophen is ok to take*    7. PRE-OPERATIVE TESTING: History and physical with medical clearance is REQUIRED within 30 days of the surgery date and is mandatory per Dr. Santos Caro. *If this is not done, your surgery will be postponed*  MEDICAL CLEARANCE WITH DR. MOSQUEDA  CBC  CMP  EKG  Discontinue tamoxifen for 2 weeks before surgery, resume two weeks after surgery    8. Please inform us if you develop any Covid-19 like symptoms, test positive or have been exposed for Covid- 19 prior to surgery.      Consent obtained   Photos taken on 04/25/2023

## 2023-04-25 NOTE — PROGRESS NOTES
Louisa Samayoa is a 36year old female who presents today for a follow-up. She reports contour abnormalities in the upper poles of bilateral breasts. Physical Examination:  Breasts: Bilateral breast incisions are well-healed. Good shape and symmetry is appreciated. Hollowing is noted in the upper poles of bilateral breast.  Abdomen: Moderate central abdominal and flank lipodystrophy is noted. There are no palpable hernias noted. Assessment and Plan:  Given the contour abnormalities we discussed proceeding with fat grafting to the upper poles of bilateral breast.  [place] procedure was reviewed with the patient. We discussed the risk of surgery including but not limited to bleeding, infection, scarring, delayed wound healing, implant infection or extrusion requiring removal, recurrent deformity, fat necrosis, cysts or calcifications requiring biopsy, injury to intra-abdominal structures, and need for further surgery. We discussed expected postoperative course including need for drains, activity limitation, and compression. Multiple questions were answered to patient's satisfaction. No guarantees as to outcome were offered. The patient expresses understanding and wishes to proceed.

## 2023-05-12 ENCOUNTER — TELEPHONE (OUTPATIENT)
Dept: SURGERY | Facility: CLINIC | Age: 43
End: 2023-05-12

## 2023-05-12 DIAGNOSIS — Z90.13 S/P BILATERAL MASTECTOMY: Primary | ICD-10-CM

## 2023-05-12 NOTE — TELEPHONE ENCOUNTER
Calling pt in regards to scheduling surgery. Informed pt that I have 11/02/2023 available at [facility] with Dr. Kamran Vega. Pt verbalized understanding and in agreement with date and location. All questions answered. Encouraged pt to call or [employer] message office with any other questions or concerns.

## 2023-05-30 ENCOUNTER — TELEPHONE (OUTPATIENT)
Dept: FAMILY MEDICINE CLINIC | Facility: CLINIC | Age: 43
End: 2023-05-30

## 2023-07-11 ENCOUNTER — TELEPHONE (OUTPATIENT)
Dept: FAMILY MEDICINE CLINIC | Facility: CLINIC | Age: 43
End: 2023-07-11

## 2023-07-11 DIAGNOSIS — Z01.818 PRE-OP EVALUATION: Primary | ICD-10-CM

## 2023-07-11 NOTE — TELEPHONE ENCOUNTER
Per note from Dr. Kirt Jamse, requesting below for surgery 11/2/23. Surgery Date:  11/02/2023                         Location:  [facility]     [] Hematocrit/Hemogram            [x] EKG   [x] CBC                                           [] Chest X-Ray  [x] CMP                                          [] PT/PTT  [] Urinalysis                                  [] MRSA Nasal Culture  [] Urinalysis with reflex                [x] History and Physical  [] Urine pregnancy                      [x] Medical Clearance  [] Qualitative HCG (blood)         [] Cardiac Clearance    Orders pended if okay to place. Do you want to add anything?

## 2023-07-11 NOTE — TELEPHONE ENCOUNTER
Pt will be having surgery on 11-2-23. Pt would like to have CBC, CMP, and EKG ordered so that she can do them prior to her upcoming appt.   Future Appointments   Date Time Provider Gordy Khouryi   7/31/2023  4:20 PM Alyssa Doe MD [facility]   8/29/2023 11:00 AM Molina Garcia MD [facility] [place]   9/27/2023  3:30 PM Barb Oliver MD [address]   10/23/2023  3:00 PM Adan Yanez, DO [facility] [address] [facility] [address]

## 2023-07-31 PROCEDURE — 87086 URINE CULTURE/COLONY COUNT: CPT | Performed by: OBSTETRICS & GYNECOLOGY

## 2023-09-27 ENCOUNTER — OFFICE VISIT (OUTPATIENT)
Dept: HEMATOLOGY/ONCOLOGY | Facility: HOSPITAL | Age: 43
End: 2023-09-27
Attending: GENETIC COUNSELOR, MS
Payer: COMMERCIAL

## 2023-09-27 VITALS
DIASTOLIC BLOOD PRESSURE: 84 MMHG | HEIGHT: 70 IN | RESPIRATION RATE: 16 BRPM | TEMPERATURE: 98 F | WEIGHT: 198.81 LBS | BODY MASS INDEX: 28.46 KG/M2 | HEART RATE: 75 BPM | SYSTOLIC BLOOD PRESSURE: 137 MMHG | OXYGEN SATURATION: 100 %

## 2023-09-27 DIAGNOSIS — Z17.0 MALIGNANT NEOPLASM OF OVERLAPPING SITES OF RIGHT BREAST IN FEMALE, ESTROGEN RECEPTOR POSITIVE: ICD-10-CM

## 2023-09-27 DIAGNOSIS — C50.811 MALIGNANT NEOPLASM OF OVERLAPPING SITES OF RIGHT BREAST IN FEMALE, ESTROGEN RECEPTOR POSITIVE: ICD-10-CM

## 2023-09-27 DIAGNOSIS — D72.820 MONOCLONAL B-CELL LYMPHOCYTOSIS OF UNDETERMINED SIGNIFICANCE: Primary | ICD-10-CM

## 2023-09-27 LAB
ALBUMIN SERPL-MCNC: 3.9 G/DL (ref 3.4–5)
ALBUMIN/GLOB SERPL: 1 {RATIO} (ref 1–2)
ALP LIVER SERPL-CCNC: 75 U/L
ALT SERPL-CCNC: 38 U/L
ANION GAP SERPL CALC-SCNC: 4 MMOL/L (ref 0–18)
AST SERPL-CCNC: 26 U/L (ref 15–37)
BASOPHILS # BLD AUTO: 0.06 X10(3) UL (ref 0–0.2)
BASOPHILS NFR BLD AUTO: 0.5 %
BILIRUB SERPL-MCNC: 0.4 MG/DL (ref 0.1–2)
BUN BLD-MCNC: 10 MG/DL (ref 7–18)
CALCIUM BLD-MCNC: 8.7 MG/DL (ref 8.5–10.1)
CHLORIDE SERPL-SCNC: 108 MMOL/L (ref 98–112)
CO2 SERPL-SCNC: 27 MMOL/L (ref 21–32)
CREAT BLD-MCNC: 0.84 MG/DL
EGFRCR SERPLBLD CKD-EPI 2021: 88 ML/MIN/1.73M2 (ref 60–?)
EOSINOPHIL # BLD AUTO: 0.26 X10(3) UL (ref 0–0.7)
EOSINOPHIL NFR BLD AUTO: 2 %
ERYTHROCYTE [DISTWIDTH] IN BLOOD BY AUTOMATED COUNT: 11.9 %
GLOBULIN PLAS-MCNC: 4 G/DL (ref 2.8–4.4)
GLUCOSE BLD-MCNC: 88 MG/DL (ref 70–99)
HCT VFR BLD AUTO: 41.2 %
HGB BLD-MCNC: 13.9 G/DL
IMM GRANULOCYTES # BLD AUTO: 0.03 X10(3) UL (ref 0–1)
IMM GRANULOCYTES NFR BLD: 0.2 %
LDH SERPL L TO P-CCNC: 193 U/L
LYMPHOCYTES # BLD AUTO: 7.17 X10(3) UL (ref 1–4)
LYMPHOCYTES NFR BLD AUTO: 56.1 %
MCH RBC QN AUTO: 31.2 PG (ref 26–34)
MCHC RBC AUTO-ENTMCNC: 33.7 G/DL (ref 31–37)
MCV RBC AUTO: 92.4 FL
MONOCYTES # BLD AUTO: 0.82 X10(3) UL (ref 0.1–1)
MONOCYTES NFR BLD AUTO: 6.4 %
NEUTROPHILS # BLD AUTO: 4.44 X10 (3) UL (ref 1.5–7.7)
NEUTROPHILS # BLD AUTO: 4.44 X10(3) UL (ref 1.5–7.7)
NEUTROPHILS NFR BLD AUTO: 34.8 %
OSMOLALITY SERPL CALC.SUM OF ELEC: 286 MOSM/KG (ref 275–295)
PLATELET # BLD AUTO: 234 10(3)UL (ref 150–450)
POTASSIUM SERPL-SCNC: 3.8 MMOL/L (ref 3.5–5.1)
PROT SERPL-MCNC: 7.9 G/DL (ref 6.4–8.2)
RBC # BLD AUTO: 4.46 X10(6)UL
SODIUM SERPL-SCNC: 139 MMOL/L (ref 136–145)
WBC # BLD AUTO: 12.8 X10(3) UL (ref 4–11)

## 2023-09-27 PROCEDURE — 99214 OFFICE O/P EST MOD 30 MIN: CPT | Performed by: INTERNAL MEDICINE

## 2023-09-27 NOTE — PROGRESS NOTES
Patient is here for follow up for breast cancer on tamoxifen. She has hot flashes at night. She has stiffness in her hands along with some tingling feeling in hands and legs. She complains of some bruising in her left ankle with swelling since July. She denies any pain there. She feels tired a lot of the time. She is eating well. She denies any fever, cough or shortness of breath.      Education Record    Learner:  Patient    Disease / Diagnosis: Breast cancer    Barriers / Limitations:  None   Comments:    Method:  Discussion   Comments:    General Topics:  Side effects and symptom management   Comments:    Outcome:  Shows understanding   Comments:

## 2023-10-07 ENCOUNTER — TELEPHONE (OUTPATIENT)
Dept: FAMILY MEDICINE CLINIC | Facility: CLINIC | Age: 43
End: 2023-10-07

## 2023-10-07 DIAGNOSIS — Z01.818 PRE-OP EXAM: Primary | ICD-10-CM

## 2023-10-16 ENCOUNTER — TELEPHONE (OUTPATIENT)
Dept: SURGERY | Facility: CLINIC | Age: 43
End: 2023-10-16

## 2023-10-16 NOTE — TELEPHONE ENCOUNTER
Spoke to patient who called to report she tested positive for COVID on 10/11/23. She reports mild fatigue, cough and a dull headache. She was informed of our current COVID policy and that since she is symptomatic her surgery must be at least 20 days since testing positive. Her symptoms must be improving or resolved by surgery. Current surgery date of 11/2/23 is within the 20 days policy. Patient wishes to postpone surgery until she feeling better. Surgery was moved to 01/07/2024. Patient was appreciative of the call.

## 2023-10-18 DIAGNOSIS — Z90.13 ABSENCE OF BREAST, BILATERAL: Primary | ICD-10-CM

## 2023-10-23 ENCOUNTER — OFFICE VISIT (OUTPATIENT)
Dept: FAMILY MEDICINE CLINIC | Facility: CLINIC | Age: 43
End: 2023-10-23

## 2023-10-23 VITALS
RESPIRATION RATE: 20 BRPM | OXYGEN SATURATION: 99 % | WEIGHT: 195 LBS | SYSTOLIC BLOOD PRESSURE: 92 MMHG | HEIGHT: 70 IN | BODY MASS INDEX: 27.92 KG/M2 | HEART RATE: 82 BPM | TEMPERATURE: 98 F | DIASTOLIC BLOOD PRESSURE: 62 MMHG

## 2023-10-23 DIAGNOSIS — R05.3 POST-COVID CHRONIC COUGH: Primary | ICD-10-CM

## 2023-10-23 DIAGNOSIS — G62.9 NEUROPATHY: ICD-10-CM

## 2023-10-23 DIAGNOSIS — U09.9 POST-COVID CHRONIC COUGH: Primary | ICD-10-CM

## 2023-10-23 PROCEDURE — 3078F DIAST BP <80 MM HG: CPT | Performed by: FAMILY MEDICINE

## 2023-10-23 PROCEDURE — 3074F SYST BP LT 130 MM HG: CPT | Performed by: FAMILY MEDICINE

## 2023-10-23 PROCEDURE — 99213 OFFICE O/P EST LOW 20 MIN: CPT | Performed by: FAMILY MEDICINE

## 2023-10-23 PROCEDURE — 3008F BODY MASS INDEX DOCD: CPT | Performed by: FAMILY MEDICINE

## 2023-10-24 RX ORDER — CODEINE PHOSPHATE AND GUAIFENESIN 10; 100 MG/5ML; MG/5ML
5 SOLUTION ORAL 3 TIMES DAILY PRN
Qty: 180 ML | Refills: 0 | Status: SHIPPED | OUTPATIENT
Start: 2023-10-24 | End: 2023-11-08

## 2023-10-24 RX ORDER — ALBUTEROL SULFATE 90 UG/1
2 AEROSOL, METERED RESPIRATORY (INHALATION) EVERY 4 HOURS PRN
Qty: 8 G | Refills: 0 | Status: SHIPPED | OUTPATIENT
Start: 2023-10-24

## 2023-12-19 NOTE — TELEPHONE ENCOUNTER
LMOM on 5-26-23 for pt to call office to schedule pre-op appt for surgery date of 11-2-23. PPW in Work in Progress bin.
normal external genitalia/no discharge/no mass/no tenderness/no ulcer/normal

## 2024-01-04 ENCOUNTER — TELEPHONE (OUTPATIENT)
Dept: SURGERY | Facility: CLINIC | Age: 44
End: 2024-01-04

## 2024-01-04 NOTE — TELEPHONE ENCOUNTER
Spoke to patient who would like to postpone surgery. She hasn't met with Dr. Boswell since April 2022 and would like to discuss if surgery is still recommended. New surgery date of 4/18/24 was offered at [facility]. Patient accepted new surgical date. She was transferred to [facility] to schedule a follow up with Dr. Boswell.     She was appreciative of the help

## 2024-01-04 NOTE — TELEPHONE ENCOUNTER
Pt called M regarding coming in and talking to Dr. Boswell and maybe pushing back surgery date, Nat will follow up by phone call.

## 2024-01-05 ENCOUNTER — TELEPHONE (OUTPATIENT)
Dept: SURGERY | Facility: CLINIC | Age: 44
End: 2024-01-05

## 2024-01-05 DIAGNOSIS — Z90.13 ABSENCE OF BREAST, BILATERAL: Primary | ICD-10-CM

## 2024-01-05 NOTE — TELEPHONE ENCOUNTER
Pt called LVM, regarding surgery date of 04/18/24 that she couldn't see it in my chart, informed her that it will take few days for her to see it in her My Chart, but that it is on there for 04/18/24. Pt was very appreciative of the call back.

## 2024-02-12 ENCOUNTER — TELEPHONE (OUTPATIENT)
Dept: FAMILY MEDICINE CLINIC | Facility: CLINIC | Age: 44
End: 2024-02-12

## 2024-03-19 RX ORDER — NITROFURANTOIN 25; 75 MG/1; MG/1
CAPSULE ORAL
COMMUNITY
Start: 2023-11-28 | End: 2024-03-19 | Stop reason: ALTCHOICE

## 2024-04-01 ENCOUNTER — OFFICE VISIT (OUTPATIENT)
Dept: FAMILY MEDICINE CLINIC | Facility: CLINIC | Age: 44
End: 2024-04-01
Payer: COMMERCIAL

## 2024-04-01 ENCOUNTER — TELEPHONE (OUTPATIENT)
Dept: SURGERY | Facility: CLINIC | Age: 44
End: 2024-04-01

## 2024-04-01 VITALS
HEART RATE: 89 BPM | DIASTOLIC BLOOD PRESSURE: 80 MMHG | BODY MASS INDEX: 28.2 KG/M2 | SYSTOLIC BLOOD PRESSURE: 112 MMHG | OXYGEN SATURATION: 96 % | RESPIRATION RATE: 16 BRPM | TEMPERATURE: 98 F | WEIGHT: 197 LBS | HEIGHT: 70 IN

## 2024-04-01 DIAGNOSIS — Z01.818 PRE-OP EXAM: Primary | ICD-10-CM

## 2024-04-01 NOTE — TELEPHONE ENCOUNTER
Spoke to RN at Dr. Del Toro's office who was confirming if new EKG was needed. Patients last EKG was done on 2/14/24. Informed her that EKG is valid for 90 days.

## 2024-04-01 NOTE — PROGRESS NOTES
Martha Lo is a 43 year old female.   Chief Complaint   Patient presents with    Pre-Op Exam     HPI:   Martha presents for preoperative evaluation and clearance for  Autologous fat grafting to the bilateral reconstructed breasts  to be done on 4/18/24.  Clearance for surgery requested by Dr. Gallegos.        Denies any history of pulmonary embolism or DVT.  Denies any history of abnormal bleeding or anemia.  No recent URI symptoms.  No recent exposure to COVID.   Pt will hold tamoxifen and nsaids for 2 weeks prior  She does not have asthma/COPD  Nonsmoker   No ACE-I order (720h ago, onward)      None          ACE-I orders (720h ago, onward)      None         Hx of     Current Outpatient Medications   Medication Sig Dispense Refill    tamoxifen 20 MG Oral Tab Take 1 tablet (20 mg total) by mouth nightly. 90 tablet 3    albuterol 108 (90 Base) MCG/ACT Inhalation Aero Soln Inhale 2 puffs into the lungs every 4 (four) hours as needed for Wheezing or Shortness of Breath (cough). 8 g 0    Lactobacillus (PROBIOTIC ACIDOPHILUS OR) Take by mouth.      ibuprofen 200 MG Oral Tab Take 1 tablet (200 mg total) by mouth every 6 (six) hours as needed for Pain.      Ascorbic Acid (VITAMIN C OR) Take 1 tablet by mouth daily.        No Known Allergies   Past Medical History:   Diagnosis Date    Anesthesia complication     HARD TO WAKE UP ONCE    Breast cancer (HCC)     1/22 - right - bilateral mastectomy    Cancer (HCC) 12/23/21    COVID-19 10/10/2023    fatigue.    Visual impairment     glasses/contact      Past Surgical History:   Procedure Laterality Date    BREAST BIOPSY      BREAST SURGERY      biopsy    MASTECTOMY LEFT  02/2022    MASTECTOMY RIGHT  02/2022    OTHER SURGICAL HISTORY  01/2017    BBreast Augmentation    TONSILLECTOMY        Family History   Problem Relation Age of Onset    Diabetes Father     Heart Disorder Mother     Cancer Mother     Thyroid disease Sister     Heart Disorder Sister     Cancer  Paternal Grandfather     Breast Cancer Other     Heart Disorder Sister       Social History     Socioeconomic History    Marital status:     Number of children: 2   Tobacco Use    Smoking status: Former    Smokeless tobacco: Never    Tobacco comments:     Quit in college-social for 1 yr   Vaping Use    Vaping Use: Never used   Substance and Sexual Activity    Alcohol use: Yes     Alcohol/week: 6.0 standard drinks of alcohol     Types: 6 Standard drinks or equivalent per week     Comment: social    Drug use: No    Sexual activity: Yes     Partners: Male     Birth control/protection: Vasectomy   Other Topics Concern    Caffeine Concern No    Exercise No    Seat Belt No    Special Diet No    Stress Concern No    Weight Concern Yes      OB History    Para Term  AB Living   2 2 2 0 0 2   SAB IAB Ectopic Multiple Live Births   0 0 0 0 2        REVIEW OF SYSTEMS:   GENERAL HEALTH: feels well otherwise, denies fever  EYES: no visual complaints or deficits  HEENT: denies nasal congestion, sinus pain or sore throat; hearing loss negative  RESPIRATORY: denies shortness of breath, wheezing or cough  CARDIOVASCULAR: denies chest pain or ACOSTA; no palpitations  GI: denies nausea, vomiting, constipation, diarrhea; no rectal bleeding; no heartburn  GENITAL/: no dysuria, urgency or frequency  MUSCULOSKELETAL: no joint complaints upper or lower extremities  NEURO: no sensory or motor complaint  PSYCHE: no symptoms of depression or anxiety  HEMATOLOGY: denies h/o anemia; denies bruising or excessive bleeding    Immunization History   Administered Date(s) Administered    Covid-19 Vaccine Moderna 100 mcg/0.5 ml 2021, 2021   Deferred Date(s) Deferred    FLULAVAL 6 months & older 0.5 ml Prefilled syringe (53583) 10/23/2023    FLUZONE 6 months and older PFS 0.5 ml (11225) 10/23/2023    Pneumococcal Conjugate PCV20 10/23/2023    TDAP 10/23/2023       EXAM:   /80   Pulse 89   Temp 98 °F (36.7 °C)  (Temporal)   Resp 16   Ht 5' 10\" (1.778 m)   Wt 197 lb (89.4 kg)   LMP 03/01/2024 (Approximate)   SpO2 96%   BMI 28.27 kg/m²   GENERAL: well developed, well nourished, in no apparent distress  SKIN: no rashes, no suspicious lesions  HEENT: normocephalic; normal nose, pharynx and TM's  EYES: PERRLA, EOMI, sclera anicteric, conjunctiva normal; fundi normal  NECK: supple, FROM, no nodes, no JVD, no thyromegaly, no carotid bruits  BREAST: no masses, well healed surgical incision sites, normal skin  RESPIRATORY: clear to percussion and auscultation  CARDIOVASCULAR: S1, S2 normal, RRR; no S3, no S4; no click; murmur negative  ABDOMEN: normal active BS+, soft, nondistended; no HSM; no masses; no bruits; no masses; nontender  MUSCULOSKELETAL: no acute synovitis upper or lower extremity  EXTREMITIES: no cyanosis, clubbing or edema, peripheral pulses intact  NEUROLOGIC: normal   PSYCHIATRIC: alert and oriented x 3; affect appropriate    ASSESSMENT & PLAN:   Preoperative Physical Exam:    Martha was examined today and is an acceptable risk to proceed with surgery.  Will obtain and evaluate preoperative labs and ECG.  Risks and benefits of surgery explained to patient.  Including;  Deep venous thrombosis, pulmonary embolus, bleeding, myocardial infarction, failure to relieve pain, wound infection, wound dehiscence, anesthesia complications, arrhythmia's etc.    Geeta Del Toro DO        This note was prepared using Dragon Medical voice recognition dictation software. As a result errors may occur. When identified these errors have been corrected. While every attempt is made to correct errors during dictation discrepancies may still exist.      Note to patient: The 21st Century Cures Act makes medical notes like these available to patients in the interest of transparency. However, be advised this is a medical document. It is intended as peer to peer communication. It is written in medical language and may contain  abbreviations or verbiage that are unfamiliar. It may appear blunt or direct. Medical documents are intended to carry relevant information, facts as evident, and the clinical opinion of the practitioner.

## 2024-04-02 ENCOUNTER — OFFICE VISIT (OUTPATIENT)
Dept: SURGERY | Facility: CLINIC | Age: 44
End: 2024-04-02
Payer: COMMERCIAL

## 2024-04-02 DIAGNOSIS — Z90.13 S/P BILATERAL MASTECTOMY: Primary | ICD-10-CM

## 2024-04-02 PROCEDURE — 99212 OFFICE O/P EST SF 10 MIN: CPT | Performed by: SURGERY

## 2024-04-02 NOTE — PROGRESS NOTES
Martha Lo is a 43 year old female who presents today for a follow-up.  She has multiple questions regarding her upcoming fat grafting procedures unsure as to whether she wishes to proceed with revision surgery.      Physical Examination:  Breasts: Bilateral breast incisions are clean dry and intact.  Acceptable shape and symmetry is noted.  Volume deficiency is noted in the upper pole of bilateral breast.  Abdomen: Moderate lower abdominal flank lipodystrophy is noted.  There are no palpable hernias noted.    Assessment and Plan:  The option of fat grafting to the upper poles of bilateral breast was reviewed with the patient.  We discussed the risk of surgery including but not limited to bleeding, infection, scarring, delayed wound healing, injury to adjacent structures, implant infection or extrusion requiring removal, cyst or calcifications requiring biopsy, injury to intra-abdominal structures, contour abnormalities, and need for further surgery were discussed.  Multiple questions reviewed with the patient satisfaction.  No guarantees as to outcome were offered.  The patient expresses understanding and wishes to proceed.  A total of 20 minutes was spent reviewing the patient's history and diagnostic testing, examining the patient, reviewing the reconstructive plan, and answering the patient's questions.

## 2024-04-02 NOTE — PATIENT INSTRUCTIONS
Surgeon:         Dr. Ulysses Boswell                                        Tel:         [phone]                                  Fax:        [phone]     Surgery/Procedure:       Autologous fat grafting to the bilateral reconstructed breasts. 1.5 hours, general anesthesia, outpatient                                      Hospital:  [facility]: [address] [place] [address]           [phone]  [facility]: [address] [facility], [place], [place] [id number] [phone]     1. Someone will need to drive you to and from the hospital if your procedure is outpatient.     2.Do not drink alcohol or smoke 24 hours prior to your procedure.     3. Bring a picture ID and your insurance card.     4. You will be contacted by the hospital the day before to confirm the procedure time and location.      5. The hospital will also contact you approximately one week before surgery to schedule your COVID test.      6. Do not take any herbal supplements or blood thinners at least one week before your procedure/surgery. This includes NSAID's (aspirin, baby aspirin, Motrin, Ibuprofen, Aleve, Advil, Naproxen, etc), Plavix, fish oil, vitamin E, turmeric, CoQ10, or green tea supplements, etc. *TYLENOL or acetaminophen is ok to take*     7. PRE-OPERATIVE TESTING: History and physical with medical clearance is REQUIRED within 30 days of the surgery date and is mandatory per Dr. Boswell. *If this is not done, your surgery will be postponed*  MEDICAL CLEARANCE WITH DR. MOSQUEDA  CBC  CMP  EKG  Discontinue tamoxifen for 2 weeks before surgery, resume two weeks after surgery     8. Please inform us if you develop any Covid-19 like symptoms, test positive or have been exposed for Covid- 19 prior to surgery.      Consent obtained   Photos taken on 04/02/2024

## 2024-04-08 ENCOUNTER — LAB ENCOUNTER (OUTPATIENT)
Dept: LAB | Age: 44
End: 2024-04-08
Attending: FAMILY MEDICINE
Payer: COMMERCIAL

## 2024-04-08 ENCOUNTER — EKG ENCOUNTER (OUTPATIENT)
Dept: LAB | Age: 44
End: 2024-04-08
Attending: FAMILY MEDICINE
Payer: COMMERCIAL

## 2024-04-08 DIAGNOSIS — Z01.818 PRE-OP EXAM: ICD-10-CM

## 2024-04-08 DIAGNOSIS — Z90.13 S/P BILATERAL MASTECTOMY: ICD-10-CM

## 2024-04-08 LAB
ALBUMIN SERPL-MCNC: 4 G/DL (ref 3.4–5)
ALBUMIN/GLOB SERPL: 1.1 {RATIO} (ref 1–2)
ALP LIVER SERPL-CCNC: 78 U/L
ALT SERPL-CCNC: 26 U/L
ANION GAP SERPL CALC-SCNC: 4 MMOL/L (ref 0–18)
AST SERPL-CCNC: 15 U/L (ref 15–37)
ATRIAL RATE: 78 BPM
BASOPHILS # BLD AUTO: 0.07 X10(3) UL (ref 0–0.2)
BASOPHILS NFR BLD AUTO: 0.4 %
BILIRUB SERPL-MCNC: 0.4 MG/DL (ref 0.1–2)
BUN BLD-MCNC: 14 MG/DL (ref 9–23)
CALCIUM BLD-MCNC: 8.7 MG/DL (ref 8.5–10.1)
CHLORIDE SERPL-SCNC: 111 MMOL/L (ref 98–112)
CO2 SERPL-SCNC: 26 MMOL/L (ref 21–32)
CREAT BLD-MCNC: 1.08 MG/DL
EGFRCR SERPLBLD CKD-EPI 2021: 65 ML/MIN/1.73M2 (ref 60–?)
EOSINOPHIL # BLD AUTO: 0.21 X10(3) UL (ref 0–0.7)
EOSINOPHIL NFR BLD AUTO: 1.3 %
ERYTHROCYTE [DISTWIDTH] IN BLOOD BY AUTOMATED COUNT: 12.6 %
GLOBULIN PLAS-MCNC: 3.5 G/DL (ref 2.8–4.4)
GLUCOSE BLD-MCNC: 83 MG/DL (ref 70–99)
HCT VFR BLD AUTO: 39.7 %
HGB BLD-MCNC: 13.5 G/DL
IMM GRANULOCYTES # BLD AUTO: 0.07 X10(3) UL (ref 0–1)
IMM GRANULOCYTES NFR BLD: 0.4 %
LYMPHOCYTES # BLD AUTO: 8.29 X10(3) UL (ref 1–4)
LYMPHOCYTES NFR BLD AUTO: 52.2 %
MCH RBC QN AUTO: 31.4 PG (ref 26–34)
MCHC RBC AUTO-ENTMCNC: 34 G/DL (ref 31–37)
MCV RBC AUTO: 92.3 FL
MONOCYTES # BLD AUTO: 1.01 X10(3) UL (ref 0.1–1)
MONOCYTES NFR BLD AUTO: 6.4 %
NEUTROPHILS # BLD AUTO: 6.24 X10 (3) UL (ref 1.5–7.7)
NEUTROPHILS # BLD AUTO: 6.24 X10(3) UL (ref 1.5–7.7)
NEUTROPHILS NFR BLD AUTO: 39.3 %
OSMOLALITY SERPL CALC.SUM OF ELEC: 292 MOSM/KG (ref 275–295)
P AXIS: 52 DEGREES
P-R INTERVAL: 138 MS
PLATELET # BLD AUTO: 262 10(3)UL (ref 150–450)
POTASSIUM SERPL-SCNC: 3.6 MMOL/L (ref 3.5–5.1)
PROT SERPL-MCNC: 7.5 G/DL (ref 6.4–8.2)
Q-T INTERVAL: 396 MS
QRS DURATION: 100 MS
QTC CALCULATION (BEZET): 451 MS
R AXIS: 51 DEGREES
RBC # BLD AUTO: 4.3 X10(6)UL
SODIUM SERPL-SCNC: 141 MMOL/L (ref 136–145)
T AXIS: 24 DEGREES
VENTRICULAR RATE: 78 BPM
WBC # BLD AUTO: 15.9 X10(3) UL (ref 4–11)

## 2024-04-08 PROCEDURE — 93010 ELECTROCARDIOGRAM REPORT: CPT | Performed by: INTERNAL MEDICINE

## 2024-04-08 PROCEDURE — 93005 ELECTROCARDIOGRAM TRACING: CPT

## 2024-04-08 PROCEDURE — 85025 COMPLETE CBC W/AUTO DIFF WBC: CPT

## 2024-04-08 PROCEDURE — 80053 COMPREHEN METABOLIC PANEL: CPT

## 2024-04-08 PROCEDURE — 36415 COLL VENOUS BLD VENIPUNCTURE: CPT

## 2024-04-09 ENCOUNTER — TELEPHONE (OUTPATIENT)
Dept: FAMILY MEDICINE CLINIC | Facility: CLINIC | Age: 44
End: 2024-04-09

## 2024-04-09 ENCOUNTER — TELEPHONE (OUTPATIENT)
Dept: SURGERY | Facility: CLINIC | Age: 44
End: 2024-04-09

## 2024-04-09 DIAGNOSIS — R94.31 ABNORMAL EKG: ICD-10-CM

## 2024-04-09 DIAGNOSIS — I45.10 INCOMPLETE RBBB: Primary | ICD-10-CM

## 2024-04-09 NOTE — TELEPHONE ENCOUNTER
Refer to Cardio. See my addendum on preop note. I referred her last year not sure if she ever saw Cardiology.     Chronic lymphocytosis- pt has f/u with Dr. Oliver in 2 days. He's been monitoring her CBC

## 2024-04-09 NOTE — TELEPHONE ENCOUNTER
Spoke to Jaylyn at Dr. Del Toro's office to have clearance note addended with abnormal EKG result.

## 2024-04-11 ENCOUNTER — OFFICE VISIT (OUTPATIENT)
Dept: HEMATOLOGY/ONCOLOGY | Age: 44
End: 2024-04-11
Attending: GENETIC COUNSELOR, MS
Payer: COMMERCIAL

## 2024-04-11 DIAGNOSIS — D72.820 MONOCLONAL B-CELL LYMPHOCYTOSIS OF UNDETERMINED SIGNIFICANCE: ICD-10-CM

## 2024-04-11 DIAGNOSIS — Z17.0 MALIGNANT NEOPLASM OF OVERLAPPING SITES OF RIGHT BREAST IN FEMALE, ESTROGEN RECEPTOR POSITIVE (HCC): Primary | ICD-10-CM

## 2024-04-11 DIAGNOSIS — C50.811 MALIGNANT NEOPLASM OF OVERLAPPING SITES OF RIGHT BREAST IN FEMALE, ESTROGEN RECEPTOR POSITIVE (HCC): Primary | ICD-10-CM

## 2024-04-11 PROCEDURE — 99214 OFFICE O/P EST MOD 30 MIN: CPT | Performed by: INTERNAL MEDICINE

## 2024-04-11 NOTE — PROGRESS NOTES
Patient is here for follow up for breast cancer.  Her tamoxifen is currently on hold for reconstruction surgery on 4/18.    She denies any problems with tamoxifen.  She is feeling well.    She had labs done on 4/8.     Education Record    Learner:  Patient    Disease / Diagnosis: Breast cancer    Barriers / Limitations:  None   Comments:    Method:  Discussion   Comments:    General Topics:  Side effects and symptom management   Comments:    Outcome:  Shows understanding   Comments:

## 2024-04-11 NOTE — PROGRESS NOTES
Cancer Center Progress Note    Problem List:      Patient Active Problem List   Diagnosis    Acute bilateral thoracic back pain    Metrorrhagia    Mild dysplasia of cervix - colpo 2019 - Pap: neg/neg 2020    Incomplete RBBB    BRCA gene mutation negative    S/P bilateral mastectomy    Moderate episode of recurrent major depressive disorder (HCC)       Interim History:    Martha Lo presents today for evaluation and management of a diagnosis of right breast cancer.    She still has tingling in her fingers and toes that is overall stable. She has no focal weakness. She has no new neurologic complaints.    She  has been on tamoxifen 20 mg daily since 3/2022. .This has been on hold since 4/4/2024 for a planned plastic surgery procedure. She has no pain. She has no fever. She has a good energy level and appetite. She has had some weight gain. She has had hot flashes. Her menstrual periods are irregular but light. She has no other complaints.    The patient had Oncotype Dx testing with a RS 13.     She had flow cytometry on peripheral blood that shows a monotypic B cell population positive for CD20, CD22, CD19, CD5, and CD23. Negative for CD10, CD38, and FMC7.      Review of Systems:   Constitutional: Negative for anorexia, fatigue, fevers, chills, night sweats and weight loss.  Psychiatric: The patient's mood was calm and appropriate for this visit.  The pertinent positives and negatives were described. All other systems were negative.    PMH/PSH:  Past Medical History:    Anesthesia complication    HARD TO WAKE UP ONCE    Breast cancer (HCC)    1/22 - right - bilateral mastectomy    Cancer (HCC)    COVID-19    fatigue.    Visual impairment    glasses/contact       Past Surgical History:   Procedure Laterality Date    Breast biopsy      Breast surgery      biopsy    Mastectomy left  02/2022    Mastectomy right  02/2022    Other surgical history  01/2017    BBreast Augmentation    Tonsillectomy         Family  History Reviewed:  Family History   Problem Relation Age of Onset    Diabetes Father     Heart Disorder Mother     Cancer Mother     Thyroid disease Sister     Heart Disorder Sister     Cancer Paternal Grandfather     Breast Cancer Other     Heart Disorder Sister        Allergies:     No Known Allergies    Medications:  No outpatient medications have been marked as taking for the 4/11/24 encounter (Office Visit) with Chadd Oliver MD.       Vital Signs:      Height: --  Weight: --  BSA (Calculated - sq m): --  Pulse: --  BP: --  Temp: --  Do Not Use - Resp Rate: --  SpO2: --      Performance Status:  ECOG 0: Fully active, able to carry on all pre-disease performance without restriction     Physical Examination:    Constitutional: Patient is alert and not in acute distress.  Eyes: Anicteric sclera, pink conjunctiva.  HEENT:  Oropharynx is clear. Neck is supple.  Respiratory: Clear to auscultation and percussion. No rales.  No wheezes.  Cardiovascular: Regular rate and rhythm. No murmurs.  Gastrointestinal: Soft, non tender with good bowel sounds.  Musculoskeletal: No edema. No calf tenderness.  Neurological: Grossly intact without focal motor or sensory deficit.  Skin: No suspicious skin lesion, no rash, no ulceration.  Lymphatics: There is no palpable lymphadenopathy throughout in the cervical, supraclavicular, or axillary regions.  Psychiatric: The patient's mood is calm and appropriate for this visit.     Labs reviewed at this visit:     Lab Results   Component Value Date    WBC 15.9 (H) 04/08/2024    RBC 4.30 04/08/2024    HGB 13.5 04/08/2024    HCT 39.7 04/08/2024    MCV 92.3 04/08/2024    MCH 31.4 04/08/2024    MCHC 34.0 04/08/2024    RDW 12.6 04/08/2024    .0 04/08/2024     Lab Results   Component Value Date     04/08/2024    K 3.6 04/08/2024     04/08/2024    CO2 26.0 04/08/2024    BUN 14 04/08/2024    CREATSERUM 1.08 (H) 04/08/2024    GLU 83 04/08/2024    CA 8.7 04/08/2024    ALKPHO 78  2024    ALT 26 2024    AST 15 2024    BILT 0.4 2024    ALB 4.0 2024    TP 7.5 2024       Radiologic imaging reviewed at this visit:    CXR on 3/4/2022:  FINDINGS:     Normal heart size and pulmonary vascularity.  Clear lungs.  No pleural effusion.  Air-fluid levels within bilateral breast tissue expanders.  Bilateral anterior chest wall drains noted.  Right axillary surgical clips.      Impression   CONCLUSION:     1. No evidence of active cardiopulmonary disease.   2. Air-fluid levels within bilateral breast tissue expanders, of uncertain significance.         Assessment/Plan:     Multifocal Invasive lobular carcinoma of the right breast:  BilateralMastectomy 2022  Grade II  Size: 1.2 cm, 1.4 cm, 0.5 cm, 0.7 cm  SLN 0/1  ER/KY strongly positive  Ki-67 low  Her2 negative  Oncotype Dx RS 13        The patient has low grade, ER positive, HER2 negative breast cancer. She is premenopausal. The Oncotype Dx is less than 15. She is doing well with ADRIANA. She will continue tamoxifen 20 mg daily. I will continue to see her at 6 month intervals.      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 Her CBC is stable with minimal increase in lymphocytes.  I recommended following her CBC at 6 month intervals.      Genetic testin gene panel was negative    Extremity tingling:    This is recent symptom. She will monitor this for the next 1 to 2 months. If it persists then will consider neurology consultation.      Chadd Oliver MD

## 2024-04-17 ENCOUNTER — TELEPHONE (OUTPATIENT)
Dept: SURGERY | Facility: CLINIC | Age: 44
End: 2024-04-17

## 2024-04-17 NOTE — TELEPHONE ENCOUNTER
Anh LIZARRAGA from patients insurance called for CPT codes for her upcoming surgery. CPT codes were provided.

## 2024-04-17 NOTE — TELEPHONE ENCOUNTER
Pt called wanting to make sure her surgery is cover and approved by insurance. Informed her that it was approved.

## 2024-04-18 ENCOUNTER — HOSPITAL ENCOUNTER (OUTPATIENT)
Facility: HOSPITAL | Age: 44
Setting detail: HOSPITAL OUTPATIENT SURGERY
Discharge: HOME | End: 2024-04-18
Attending: SURGERY | Admitting: SURGERY
Payer: COMMERCIAL

## 2024-04-18 ENCOUNTER — ANESTHESIA (OUTPATIENT)
Dept: SURGERY | Facility: HOSPITAL | Age: 44
End: 2024-04-18
Payer: COMMERCIAL

## 2024-04-18 ENCOUNTER — ANESTHESIA EVENT (OUTPATIENT)
Dept: SURGERY | Facility: HOSPITAL | Age: 44
End: 2024-04-18
Payer: COMMERCIAL

## 2024-04-18 VITALS
DIASTOLIC BLOOD PRESSURE: 62 MMHG | TEMPERATURE: 97 F | HEART RATE: 84 BPM | BODY MASS INDEX: 27.72 KG/M2 | RESPIRATION RATE: 18 BRPM | HEIGHT: 70 IN | OXYGEN SATURATION: 94 % | SYSTOLIC BLOOD PRESSURE: 110 MMHG | WEIGHT: 193.63 LBS

## 2024-04-18 DIAGNOSIS — Z90.13 S/P BILATERAL MASTECTOMY: Primary | ICD-10-CM

## 2024-04-18 LAB — B-HCG UR QL: NEGATIVE

## 2024-04-18 PROCEDURE — 81025 URINE PREGNANCY TEST: CPT

## 2024-04-18 PROCEDURE — 0HUV37Z SUPPLEMENT BILATERAL BREAST WITH AUTOLOGOUS TISSUE SUBSTITUTE, PERCUTANEOUS APPROACH: ICD-10-PCS | Performed by: SURGERY

## 2024-04-18 PROCEDURE — 0JD83ZZ EXTRACTION OF ABDOMEN SUBCUTANEOUS TISSUE AND FASCIA, PERCUTANEOUS APPROACH: ICD-10-PCS | Performed by: SURGERY

## 2024-04-18 RX ORDER — HYDROMORPHONE HYDROCHLORIDE 1 MG/ML
0.2 INJECTION, SOLUTION INTRAMUSCULAR; INTRAVENOUS; SUBCUTANEOUS EVERY 5 MIN PRN
Status: DISCONTINUED | OUTPATIENT
Start: 2024-04-18 | End: 2024-04-18

## 2024-04-18 RX ORDER — SODIUM CHLORIDE, SODIUM LACTATE, POTASSIUM CHLORIDE, CALCIUM CHLORIDE 600; 310; 30; 20 MG/100ML; MG/100ML; MG/100ML; MG/100ML
INJECTION, SOLUTION INTRAVENOUS CONTINUOUS
Status: DISCONTINUED | OUTPATIENT
Start: 2024-04-18 | End: 2024-04-18

## 2024-04-18 RX ORDER — MIDAZOLAM HYDROCHLORIDE 1 MG/ML
INJECTION INTRAMUSCULAR; INTRAVENOUS AS NEEDED
Status: DISCONTINUED | OUTPATIENT
Start: 2024-04-18 | End: 2024-04-18 | Stop reason: SURG

## 2024-04-18 RX ORDER — ONDANSETRON 4 MG/1
4 TABLET, FILM COATED ORAL EVERY 8 HOURS PRN
Qty: 12 TABLET | Refills: 0 | Status: SHIPPED | OUTPATIENT
Start: 2024-04-18

## 2024-04-18 RX ORDER — CEFAZOLIN SODIUM 1 G/3ML
INJECTION, POWDER, FOR SOLUTION INTRAMUSCULAR; INTRAVENOUS AS NEEDED
Status: DISCONTINUED | OUTPATIENT
Start: 2024-04-18 | End: 2024-04-18 | Stop reason: SURG

## 2024-04-18 RX ORDER — NALOXONE HYDROCHLORIDE 0.4 MG/ML
0.08 INJECTION, SOLUTION INTRAMUSCULAR; INTRAVENOUS; SUBCUTANEOUS AS NEEDED
Status: DISCONTINUED | OUTPATIENT
Start: 2024-04-18 | End: 2024-04-18

## 2024-04-18 RX ORDER — HYDROCODONE BITARTRATE AND ACETAMINOPHEN 5; 325 MG/1; MG/1
1 TABLET ORAL ONCE AS NEEDED
Status: DISCONTINUED | OUTPATIENT
Start: 2024-04-18 | End: 2024-04-18

## 2024-04-18 RX ORDER — KETOROLAC TROMETHAMINE 30 MG/ML
INJECTION, SOLUTION INTRAMUSCULAR; INTRAVENOUS AS NEEDED
Status: DISCONTINUED | OUTPATIENT
Start: 2024-04-18 | End: 2024-04-18 | Stop reason: SURG

## 2024-04-18 RX ORDER — DEXAMETHASONE SODIUM PHOSPHATE 4 MG/ML
VIAL (ML) INJECTION AS NEEDED
Status: DISCONTINUED | OUTPATIENT
Start: 2024-04-18 | End: 2024-04-18 | Stop reason: SURG

## 2024-04-18 RX ORDER — DOCUSATE SODIUM 100 MG/1
100 CAPSULE, LIQUID FILLED ORAL 2 TIMES DAILY
Qty: 30 CAPSULE | Refills: 1 | Status: SHIPPED | OUTPATIENT
Start: 2024-04-18

## 2024-04-18 RX ORDER — ACETAMINOPHEN 500 MG
1000 TABLET ORAL ONCE
Status: DISCONTINUED | OUTPATIENT
Start: 2024-04-18 | End: 2024-04-18 | Stop reason: HOSPADM

## 2024-04-18 RX ORDER — MEPERIDINE HYDROCHLORIDE 25 MG/ML
12.5 INJECTION INTRAMUSCULAR; INTRAVENOUS; SUBCUTANEOUS AS NEEDED
Status: DISCONTINUED | OUTPATIENT
Start: 2024-04-18 | End: 2024-04-18

## 2024-04-18 RX ORDER — MIDAZOLAM HYDROCHLORIDE 1 MG/ML
1 INJECTION INTRAMUSCULAR; INTRAVENOUS EVERY 5 MIN PRN
Status: DISCONTINUED | OUTPATIENT
Start: 2024-04-18 | End: 2024-04-18

## 2024-04-18 RX ORDER — ONDANSETRON 2 MG/ML
INJECTION INTRAMUSCULAR; INTRAVENOUS AS NEEDED
Status: DISCONTINUED | OUTPATIENT
Start: 2024-04-18 | End: 2024-04-18 | Stop reason: SURG

## 2024-04-18 RX ORDER — SCOLOPAMINE TRANSDERMAL SYSTEM 1 MG/1
1 PATCH, EXTENDED RELEASE TRANSDERMAL ONCE
Status: DISCONTINUED | OUTPATIENT
Start: 2024-04-18 | End: 2024-04-18

## 2024-04-18 RX ORDER — ACETAMINOPHEN 500 MG
1000 TABLET ORAL ONCE AS NEEDED
Status: DISCONTINUED | OUTPATIENT
Start: 2024-04-18 | End: 2024-04-18

## 2024-04-18 RX ORDER — CEFAZOLIN SODIUM/WATER 2 G/20 ML
2 SYRINGE (ML) INTRAVENOUS ONCE
Status: DISCONTINUED | OUTPATIENT
Start: 2024-04-18 | End: 2024-04-18 | Stop reason: HOSPADM

## 2024-04-18 RX ORDER — CEPHALEXIN 500 MG/1
500 CAPSULE ORAL 4 TIMES DAILY
Qty: 20 CAPSULE | Refills: 0 | Status: SHIPPED | OUTPATIENT
Start: 2024-04-18 | End: 2024-04-23

## 2024-04-18 RX ORDER — LABETALOL HYDROCHLORIDE 5 MG/ML
5 INJECTION, SOLUTION INTRAVENOUS EVERY 5 MIN PRN
Status: DISCONTINUED | OUTPATIENT
Start: 2024-04-18 | End: 2024-04-18

## 2024-04-18 RX ORDER — KETAMINE HYDROCHLORIDE 50 MG/ML
INJECTION, SOLUTION INTRAMUSCULAR; INTRAVENOUS AS NEEDED
Status: DISCONTINUED | OUTPATIENT
Start: 2024-04-18 | End: 2024-04-18 | Stop reason: SURG

## 2024-04-18 RX ORDER — ROCURONIUM BROMIDE 10 MG/ML
INJECTION, SOLUTION INTRAVENOUS AS NEEDED
Status: DISCONTINUED | OUTPATIENT
Start: 2024-04-18 | End: 2024-04-18 | Stop reason: SURG

## 2024-04-18 RX ORDER — LIDOCAINE HYDROCHLORIDE AND EPINEPHRINE 10; 10 MG/ML; UG/ML
INJECTION, SOLUTION INFILTRATION; PERINEURAL AS NEEDED
Status: DISCONTINUED | OUTPATIENT
Start: 2024-04-18 | End: 2024-04-18 | Stop reason: HOSPADM

## 2024-04-18 RX ORDER — HYDROCODONE BITARTRATE AND ACETAMINOPHEN 5; 325 MG/1; MG/1
1-2 TABLET ORAL EVERY 4 HOURS PRN
Qty: 20 TABLET | Refills: 0 | Status: SHIPPED | OUTPATIENT
Start: 2024-04-18

## 2024-04-18 RX ORDER — HYDROMORPHONE HYDROCHLORIDE 1 MG/ML
0.6 INJECTION, SOLUTION INTRAMUSCULAR; INTRAVENOUS; SUBCUTANEOUS EVERY 5 MIN PRN
Status: DISCONTINUED | OUTPATIENT
Start: 2024-04-18 | End: 2024-04-18

## 2024-04-18 RX ORDER — LIDOCAINE HYDROCHLORIDE 10 MG/ML
INJECTION, SOLUTION EPIDURAL; INFILTRATION; INTRACAUDAL; PERINEURAL AS NEEDED
Status: DISCONTINUED | OUTPATIENT
Start: 2024-04-18 | End: 2024-04-18 | Stop reason: SURG

## 2024-04-18 RX ORDER — PROCHLORPERAZINE EDISYLATE 5 MG/ML
5 INJECTION INTRAMUSCULAR; INTRAVENOUS EVERY 8 HOURS PRN
Status: DISCONTINUED | OUTPATIENT
Start: 2024-04-18 | End: 2024-04-18

## 2024-04-18 RX ORDER — HYDROMORPHONE HYDROCHLORIDE 1 MG/ML
0.4 INJECTION, SOLUTION INTRAMUSCULAR; INTRAVENOUS; SUBCUTANEOUS EVERY 5 MIN PRN
Status: DISCONTINUED | OUTPATIENT
Start: 2024-04-18 | End: 2024-04-18

## 2024-04-18 RX ORDER — HYDROCODONE BITARTRATE AND ACETAMINOPHEN 5; 325 MG/1; MG/1
2 TABLET ORAL ONCE AS NEEDED
Status: DISCONTINUED | OUTPATIENT
Start: 2024-04-18 | End: 2024-04-18

## 2024-04-18 RX ORDER — ONDANSETRON 2 MG/ML
4 INJECTION INTRAMUSCULAR; INTRAVENOUS EVERY 6 HOURS PRN
Status: DISCONTINUED | OUTPATIENT
Start: 2024-04-18 | End: 2024-04-18

## 2024-04-18 RX ADMIN — KETAMINE HYDROCHLORIDE 25 MG: 50 INJECTION, SOLUTION INTRAMUSCULAR; INTRAVENOUS at 07:59:00

## 2024-04-18 RX ADMIN — MIDAZOLAM HYDROCHLORIDE 2 MG: 1 INJECTION INTRAMUSCULAR; INTRAVENOUS at 07:37:00

## 2024-04-18 RX ADMIN — SODIUM CHLORIDE, SODIUM LACTATE, POTASSIUM CHLORIDE, CALCIUM CHLORIDE: 600; 310; 30; 20 INJECTION, SOLUTION INTRAVENOUS at 07:31:00

## 2024-04-18 RX ADMIN — DEXAMETHASONE SODIUM PHOSPHATE 8 MG: 4 MG/ML VIAL (ML) INJECTION at 07:46:00

## 2024-04-18 RX ADMIN — LIDOCAINE HYDROCHLORIDE 90 MG: 10 INJECTION, SOLUTION EPIDURAL; INFILTRATION; INTRACAUDAL; PERINEURAL at 07:39:00

## 2024-04-18 RX ADMIN — KETOROLAC TROMETHAMINE 30 MG: 30 INJECTION, SOLUTION INTRAMUSCULAR; INTRAVENOUS at 09:04:00

## 2024-04-18 RX ADMIN — ONDANSETRON 4 MG: 2 INJECTION INTRAMUSCULAR; INTRAVENOUS at 09:02:00

## 2024-04-18 RX ADMIN — CEFAZOLIN SODIUM 2 G: 1 INJECTION, POWDER, FOR SOLUTION INTRAMUSCULAR; INTRAVENOUS at 07:55:00

## 2024-04-18 RX ADMIN — ROCURONIUM BROMIDE 50 MG: 10 INJECTION, SOLUTION INTRAVENOUS at 07:40:00

## 2024-04-18 RX ADMIN — SODIUM CHLORIDE, SODIUM LACTATE, POTASSIUM CHLORIDE, CALCIUM CHLORIDE: 600; 310; 30; 20 INJECTION, SOLUTION INTRAVENOUS at 09:20:00

## 2024-04-18 NOTE — ANESTHESIA PROCEDURE NOTES
Airway  Date/Time: 4/18/2024 7:42 AM  Urgency: elective    Airway not difficult    General Information and Staff    Patient location during procedure: OR  Anesthesiologist: Derrick Lomeli MD  Resident/CRNA: Pricilla Sweet CRNA  Performed: CRNA   Performed by: Pricilla Sweet CRNA  Authorized by: Derrick Lomeli MD      Indications and Patient Condition  Indications for airway management: anesthesia  Sedation level: deep  Preoxygenated: yes  Patient position: sniffing  Mask difficulty assessment: 1 - vent by mask    Final Airway Details  Final airway type: endotracheal airway      Successful airway: ETT  Cuffed: yes   Successful intubation technique: direct laryngoscopy  Endotracheal tube insertion site: oral  Blade: Tracy  Blade size: #4  ETT size (mm): 7.0    Cormack-Lehane Classification: grade IIA - partial view of glottis  Placement verified by: capnometry   Measured from: lips  ETT to lips (cm): 21  Number of attempts at approach: 1    Additional Comments  Atraumatic intubation. Dentition and OP as per preop.

## 2024-04-18 NOTE — H&P
No  change in artery and needs H&P from 4/10.  We reviewed the plan forf  fat grafting to the upper poles of bilateral breast was reviewed with the patient. We discussed the risk of surgery including but not limited to bleeding, infection, scarring, delayed wound healing, injury to adjacent structures, implant infection or extrusion requiring removal, cyst or calcifications requiring biopsy, injury to intra-abdominal structures, contour abnormalities, and need for further surgery were discussed. Multiple questions reviewed with the patient satisfaction. No guarantees as to outcome were offered. The patient expresses understanding and wishes to proceed.

## 2024-04-18 NOTE — DISCHARGE INSTRUCTIONS
Plastic Surgery Home Care Instructions      We hope you were pleased with your care at [facility].  We wish you the best outcome and overall experience with your operation.  These instructions will help to minimize pain, optimize healing, and improve the likelihood of a successful result.    What To Expect  There will be some spotting of the incision lines for the next few days.  Your breasts will be swollen and might feel congested for the next 1-2 weeks  Temporary areas of numbness are typical in the early weeks following a breast procedure.  Normalization of sensation can typically take up to several months following the operation.  Mild bruising, mild swelling and discomfort in the areas of fat grafting is typical.   Please DO NOT apply heat or ice to the affected area    Bandages (Dressing)  Keep dressings clean and dry  Do not remove your bra unless for hygiene purposes - compression is key - especially at night. You can change to a supportive sports bra or camilsole if this provides good compression and you are more comfortable in that rather than the surgical bra. No underwire.   You are to wear your bra 24/7 for 6 weeks post-operatively.   Reinforce the dressing with insertion of additional padding as needed - this is not required - for your comfort only  Leave white steri-strips in place over incisions.  Wear abdominal binder, foam and ace wraps at all times for at least 3 days after surgery. After this, you can change to your own compression garments if they area more comfortable (such as Spanx etc). Wear some sort of abdominal and thigh compression at all times for at least 7 days after surgery. After 7 days, abdominal and thigh compression is not medically necessary, but compression can continue to help with swelling and prevent contour irregularities when worn for 6 weeks postoperatively.     Bathing/Showers  You can resume showering tomorrow. Let the soap and water run over incisions, do not  scrub.   No baths, swimming, or hot tubs until you receive medical permission    Pain Medication: Norco  Take one or two tablets every six hours as needed for pain.  Do not take narcotics, if you do not have pain. You can take Tylenol if you are not taking Norco. DO NOT take both Tylenol and Norco together as there is already Tylenol in the Norco.  Keep stools soft.  Take a stool softener (Metamucil, Milk of Magnesia, Colace).  Eating fruit will also help to prevent constipation    Antibiotics  Antibiotics will help minimize the risk of a wound infection  Fill the prescription as directed   Follow the instructions as written on the bottle's label  Call our office if you experience nausea, rash, or other symptoms which might be a possible side effect.    Over-The-Counter Medication  Non-prescription anti-inflammatory medications can also help to ease the pain.  You can take Aleve or ibuprofen starting 48 hours after surgery  Take as directed on the bottle  Drink a full glass of water with the medication    Home Medication  Resume your home medications as instructed  Do not resume herbal medications for two weeks    Diet  Resume your normal diet    Activity  No strenuous activity or heavy lifting (no lifting over 10 pounds)  No activities that involve your pectoralis muscles (no planks, push-ups, etc)  You can go up and down the stairs as tolerated.   You cannot return to work, if your work requires strenuous activity.  You cannot return to physical exercise, sports, or gym workouts until you are allowed to participate in strenuous activity.    Driving  Do not drive, if you are taking pain medication.    Return to Work or School  You can return to work when you are not taking pain medication, if your work does not involve strenuous activity.  Contact the office, if you need a medical note.     Follow-up Appointment   Our office will call you to set up a time    Questions or Concerns  Call Dr. Boswell's office if you  experience severe pain not controlled by pain medication, swelling, numbness, tingling, bleeding, fever, or other concerns.   If he is not available, another physician will be able to address your concerns.    Martha     Thank you for coming to [facility] for your operation.  The nurses and the anesthesiologist try very hard to make sure you receive the best care possible.  Your trust in them is greatly appreciated.      Thanks so much,  Dr. Elbert Looney, NUBIA-RACHEL Johns RN      You received a drug called Toradol which is an Anti Inflammatory at: 9a  If you are allowed to take Anti inflammatories:    Do not take any Anti Inflammatory like Motrin, Aleve or Ibuprofen until after: 3p  Please report any suspected allergic reactions or bleeding issues to your doctor

## 2024-04-18 NOTE — BRIEF OP NOTE
Pre-Operative Diagnosis: S/P bilateral mastectomy [Z90.13]     Post-Operative Diagnosis: S/P bilateral mastectomy [Z90.13]      Procedure Performed:   Autologous fat grafting to the bilateral reconstructed breasts    Surgeons and Role:     * Ulysses Boswell MD - Primary    Assistant(s):  APN: Nicole Looney APRN     Surgical Findings: Nl     Specimen: None     Estimated Blood Loss: Blood Output: 3 mL (4/18/2024  9:03 AM)          Ulysses Boswell MD  4/18/2024  9:07 AM

## 2024-04-18 NOTE — OPERATIVE REPORT
[facility]    PATIENT'S NAME: PHILIP CAMERON   ATTENDING PHYSICIAN: Ulysses Boswell M.D.   OPERATING PHYSICIAN: Ulysses Boswell M.D.   PATIENT ACCOUNT#:   [id number]    LOCATION:  [facility] 10 [facility] 10  MEDICAL RECORD #:   [id number]       YOB: 1980  ADMISSION DATE:       04/18/2024      OPERATION DATE:  04/18/2024    OPERATIVE REPORT      PREOPERATIVE DIAGNOSIS:  History of bilateral mastectomy and implant reconstruction.  POSTOPERATIVE DIAGNOSIS:  History of bilateral mastectomy and implant reconstruction.  PROCEDURE:  Autologous fat grafting to bilateral reconstructed breasts with injected volume of 110 mL.    ASSISTANT:  BELA Sanchez.    ANESTHESIA:  General.    ESTIMATED BLOOD LOSS:  Minimal.    COMPLICATIONS:  None.    INDICATIONS:  The patient is a 43-year-old female who previously underwent a bilateral mastectomy and implant-based reconstruction.  She presents with volume deficiency in the upper poles of bilateral breasts and wishes to proceed with autologous fat grafting.     OPERATIVE TECHNIQUE:  Informed consent was obtained from the patient.  The risks, benefits, and alternatives were reviewed with the patient preoperatively.  She expressed understanding and wished to proceed.  The patient was marked in the preoperative holding area in the upright position.  The midline and inframammary folds were marked.  The areas to be fat grafted were marked.  Areas of central abdominal lipodystrophy were then marked for liposuction.  The patient was then taken to the operating room, properly identified, placed in a supine position.  Sequential compression devices were placed on bilateral lower extremities.  Intravenous antibiotic prophylaxis was administered.  The patient then underwent successful induction of general anesthesia and endotracheal intubation.  The arms were placed abducted on foam-padded cradles and loosely secured with Kerlix.  The chest and abdomen were  prepped and draped sterilely.  The proposed liposuction port sites in the lateral abdomen were infiltrated with 1% lidocaine with epinephrine.  Stab incisions were then created and 1 L of tumescent solution was infiltrated into the central abdomen.  After allowing for the tumescent to take effect, power-assisted liposuction of the central abdomen was performed using a 5 mm Mercedes tip cannula.  Approximately 350 mL of lipoaspirate were collected using the [employer] system.  The fat was processed in the usual fashion, and the port sites were closed with 3-0 Vicryl deep dermal sutures.  Next the patient was placed in the upright position.  Stab incisions were then created in the existing breast scars.  Fat was then grafted to the bilateral breasts in the premarked areas.  A total of 55 mL was grafted to each breast for a total of 110 mL.  The fat grafting ports were closed with 5-0 plain gut suture.  Dermabond and Steri-Strips were placed on the incisions.  Fluff gauze and a surgical bra were placed on the breasts.  An abdominal binder was placed on the abdomen.  The patient was awakened, extubated, taken to the recovery area in stable condition.  There were no operative complications.  All needle, sponge, and instrument counts were correct at the end of the procedure.      Dictated By Ulysses Boswell M.D.  d: 04/18/2024 09:10:12  t: 04/18/2024 09:19:54  [facility] [facility] [id number]  [facility]/

## 2024-04-18 NOTE — ANESTHESIA POSTPROCEDURE EVALUATION
[facility]    Martha Lo Patient Status:  Hospital Outpatient Surgery   Age/Gender 43 year old female MRN [id number]   Location [facility] SURGERY Attending Ulysses Boswell MD   Hosp Day # 0 PCP Geeta Del Toro DO       Anesthesia Post-op Note    Autologous fat grafting to the bilateral reconstructed breasts    Procedure Summary       Date: 04/18/24 Room / Location:  MAIN OR [address] [place] [address] MAIN OR    Anesthesia Start: 0731 Anesthesia Stop: 0929    Procedure: Autologous fat grafting to the bilateral reconstructed breasts (Bilateral: Breast) Diagnosis:       S/P bilateral mastectomy      (S/P bilateral mastectomy [Z90.13])    Surgeons: Ulysses Boswell MD Anesthesiologist: Derrick Lomeli MD    Anesthesia Type: general ASA Status: 2            Anesthesia Type: No value filed.    Vitals Value Taken Time   /68 04/18/24 0929   Temp 97.3 04/18/24 0929   Pulse 94 04/18/24 0929   Resp 18 04/18/24 0929   SpO2 94 % 04/18/24 0929   Vitals shown include unfiled device data.    Patient Location: PACU    Anesthesia Type: general    Airway Patency: patent, extubated and oral/nasal airway    Postop Pain Control: adequate    Mental Status: sedated until time of extubation    Nausea/Vomiting: none    Cardiopulmonary/Hydration status: stable euvolemic    Complications: no apparent anesthesia related complications    Postop vital signs: stable    Comments: Report to PACU RN Faith.    Dental Exam: Unchanged from Preop    Patient to be discharged from PACU when criteria met.

## 2024-04-18 NOTE — ANESTHESIA PREPROCEDURE EVALUATION
PRE-OP EVALUATION    Patient Name: Martha Lo    Admit Diagnosis: S/P bilateral mastectomy [Z90.13]    Pre-op Diagnosis: S/P bilateral mastectomy [Z90.13]    Autologous fat grafting to the bilateral reconstructed breasts    Anesthesia Procedure: Autologous fat grafting to the bilateral reconstructed breasts (Bilateral)    Surgeons and Role:     * Ulysses Boswell MD - Primary    Pre-op vitals reviewed.  Temp: 98.1 °F (36.7 °C)  Pulse: 83  Resp: 16  BP: 132/75  SpO2: 98 %  Body mass index is 27.78 kg/m².    Current medications reviewed.  Hospital Medications:  • acetaminophen (Tylenol Extra Strength) tab 1,000 mg  1,000 mg Oral Once   • scopolamine (Transderm-Scop) 1 MG/3DAYS patch 1 patch  1 patch Transdermal Once   • lactated ringers infusion   Intravenous Continuous   • ceFAZolin (Ancef) 2 g in 20mL IV syringe premix  2 g Intravenous Once   • EPINEPHrine (Adrenalin) 1 mg, lidocaine (Xylocaine) 1 % 50 mL in lactated ringers 1,000 mL tumescent mixture/irrigation   Infiltration Once (Intra-Op)       Outpatient Medications:     Medications Prior to Admission   Medication Sig Dispense Refill Last Dose   • tamoxifen 20 MG Oral Tab Take 1 tablet (20 mg total) by mouth nightly. 90 tablet 3 4/4/2024   • Lactobacillus (PROBIOTIC ACIDOPHILUS OR) Take by mouth.   Past Month   • ibuprofen 200 MG Oral Tab Take 1 tablet (200 mg total) by mouth every 6 (six) hours as needed for Pain.   Past Month   • Ascorbic Acid (VITAMIN C OR) Take 1 tablet by mouth daily.   Past Month   • albuterol 108 (90 Base) MCG/ACT Inhalation Aero Soln Inhale 2 puffs into the lungs every 4 (four) hours as needed for Wheezing or Shortness of Breath (cough). (Patient not taking: Reported on 4/11/2024) 8 g 0 More than a month       Allergies: Patient has no known allergies.      Anesthesia Evaluation    Patient summary reviewed.    Anesthetic Complications  (+) history of anesthetic complications  History of: PONV        GI/Hepatic/Renal    Negative GI/hepatic/renal ROS.                             Cardiovascular  Comment: Incomplete RBBB    ECG reviewed.  Exercise tolerance: good     MET: >4                                           Endo/Other    Negative endo/other ROS.                              Pulmonary    Negative pulmonary ROS.                       Neuro/Psych      (+) depression                        Breast cancer      Past Surgical History:   Procedure Laterality Date   • Breast biopsy     • Breast surgery      biopsy   • Mastectomy left  02/2022   • Mastectomy right  02/2022   • Other surgical history  01/2017    BBreast Augmentation   • Tonsillectomy       Social History     Socioeconomic History   • Marital status:    • Number of children: 2   Tobacco Use   • Smoking status: Former   • Smokeless tobacco: Never   • Tobacco comments:     Quit in college-social for 1 yr   Vaping Use   • Vaping status: Never Used   Substance and Sexual Activity   • Alcohol use: Yes     Alcohol/week: 6.0 standard drinks of alcohol     Types: 6 Standard drinks or equivalent per week     Comment: social   • Drug use: No   • Sexual activity: Yes     Partners: Male     Birth control/protection: Vasectomy   Other Topics Concern   • Caffeine Concern No   • Exercise No   • Seat Belt No   • Special Diet No   • Stress Concern No   • Weight Concern Yes     History   Drug Use No     Available pre-op labs reviewed.  Lab Results   Component Value Date    WBC 15.9 (H) 04/08/2024    RBC 4.30 04/08/2024    HGB 13.5 04/08/2024    HCT 39.7 04/08/2024    MCV 92.3 04/08/2024    MCH 31.4 04/08/2024    MCHC 34.0 04/08/2024    RDW 12.6 04/08/2024    .0 04/08/2024     Lab Results   Component Value Date     04/08/2024    K 3.6 04/08/2024     04/08/2024    CO2 26.0 04/08/2024    BUN 14 04/08/2024    CREATSERUM 1.08 (H) 04/08/2024    GLU 83 04/08/2024    CA 8.7 04/08/2024            Airway      Mallampati: II  Mouth opening: >3 FB  TM  distance: > 6 cm  Neck ROM: full Cardiovascular    Cardiovascular exam normal.  Rhythm: regular  Rate: normal  (-) murmur   Dental    Dentition appears grossly intact         Pulmonary    Pulmonary exam normal.  Breath sounds clear to auscultation bilaterally.               Other findings        ASA: 2   Plan: general  NPO status verified and patient meets guidelines.        Comment: GETA discussed in detail.  Risk of sore throat, cough, dental trauma, PONV discussed.  Patient expresses understanding and wishes to proceed. All questions answered.    Plan/risks discussed with: patient and spouse            Present on Admission:  **None**

## 2024-04-25 ENCOUNTER — NURSE ONLY (OUTPATIENT)
Dept: SURGERY | Facility: CLINIC | Age: 44
End: 2024-04-25
Payer: COMMERCIAL

## 2024-04-25 PROCEDURE — 99024 POSTOP FOLLOW-UP VISIT: CPT | Performed by: SURGERY

## 2024-04-25 NOTE — PROGRESS NOTES
Martha Lo is a 43 year old female who presents today for a follow-up after autologous fat grafting to the bilateral reconstructed breasts on 4/18/2024 by Dr. Boswell.       She denies fever and chills. She denies nausea, vomiting, diarrhea or constipation. She denies shortness of breath or chest pain.      Her pain is controlled.        Physical Exam     Breasts: Minimal resolving ecchymosis on the upper poles of the bilateral breast consistent with fat grafting. No sign of erythema or hematoma. Incisions are healing well without wound separation or wound drainage. Plain gut sutures in place.   Abdomen: Soft and appropriately tender. No sign of erythema, ecchymosis or hematoma. Incisions are healing well without wound separation or wound drainage.     There were no vitals filed for this visit.      Assessment and Plan     Martha Lo is doing well s/p autologous fat grafting to the bilateral reconstructed breasts on 4/18/2024 by Dr. Boswell.     Bilateral breast and bilateral abdomen steri-strips were removed. Patient tolerated this well. The incisions are healing well. Incisions were cleaned with alcohol. The breast plain gut sutures were trimmed. The bilateral breast and bilateral abdominal incisions were redressed with neosporin and band-aids. She will change these dressings daily until sites are healed.     We discussed continued breast compression for the full 6 weeks. She was instructed she may now discontinue her abdominal compression per her comfort or downgrade to a high rise compression legging or spanx.    We discussed continued activity restrictions.     She will follow up with Dr. Boswell on 5/24/2024 for wound check.    Questions were answered. Patient understands.     Nat Smith RN  4/25/2024  8:53 AM

## 2024-05-28 ENCOUNTER — OFFICE VISIT (OUTPATIENT)
Dept: SURGERY | Facility: CLINIC | Age: 44
End: 2024-05-28

## 2024-05-28 DIAGNOSIS — Z90.13 S/P BILATERAL MASTECTOMY: Primary | ICD-10-CM

## 2024-05-28 PROCEDURE — 99024 POSTOP FOLLOW-UP VISIT: CPT | Performed by: SURGERY

## 2024-05-28 NOTE — PROGRESS NOTES
Martha Lo is a 43 year old female who presents today in follow-up.  She denies fever and chills.  She reports a raised area of her right axillary scar from when she was able to express a small amount of whitish material.    Physical Examination:  Breasts:Bilateral breast incisions are clean dry and intact.  Abdominal incisions are well-healed.  The right axilla is noted to have a slightly raised area at the most posterior aspect of the scar consistent with a possible suture granuloma.    Assessment and Plan:  Patient is doing well.  We discussed performing massage to the right axillary wound.  The patient will follow-up in 6 weeks.  Should the raised area persist we discussed excisional biopsy under local anesthesia.  In the interim, she may resume regular activity with compression in place.  The plan was reviewed with the patient and questions were answered.

## 2024-06-05 ENCOUNTER — OFFICE VISIT (OUTPATIENT)
Dept: FAMILY MEDICINE CLINIC | Facility: CLINIC | Age: 44
End: 2024-06-05
Payer: COMMERCIAL

## 2024-06-05 VITALS
SYSTOLIC BLOOD PRESSURE: 108 MMHG | DIASTOLIC BLOOD PRESSURE: 70 MMHG | TEMPERATURE: 97 F | RESPIRATION RATE: 18 BRPM | OXYGEN SATURATION: 99 % | BODY MASS INDEX: 27.6 KG/M2 | HEART RATE: 82 BPM | WEIGHT: 192.81 LBS | HEIGHT: 70 IN

## 2024-06-05 DIAGNOSIS — J40 BRONCHITIS WITH ACUTE WHEEZING: ICD-10-CM

## 2024-06-05 DIAGNOSIS — Z00.00 LABORATORY TESTS ORDERED AS PART OF A COMPLETE PHYSICAL EXAM (CPE): ICD-10-CM

## 2024-06-05 DIAGNOSIS — J01.10 ACUTE NON-RECURRENT FRONTAL SINUSITIS: ICD-10-CM

## 2024-06-05 DIAGNOSIS — U09.9 POST-COVID CHRONIC COUGH: ICD-10-CM

## 2024-06-05 DIAGNOSIS — R05.1 ACUTE COUGH: Primary | ICD-10-CM

## 2024-06-05 DIAGNOSIS — R05.3 POST-COVID CHRONIC COUGH: ICD-10-CM

## 2024-06-05 PROCEDURE — 3074F SYST BP LT 130 MM HG: CPT | Performed by: FAMILY MEDICINE

## 2024-06-05 PROCEDURE — 99213 OFFICE O/P EST LOW 20 MIN: CPT | Performed by: FAMILY MEDICINE

## 2024-06-05 PROCEDURE — 3078F DIAST BP <80 MM HG: CPT | Performed by: FAMILY MEDICINE

## 2024-06-05 PROCEDURE — 3008F BODY MASS INDEX DOCD: CPT | Performed by: FAMILY MEDICINE

## 2024-06-05 RX ORDER — AMOXICILLIN AND CLAVULANATE POTASSIUM 875; 125 MG/1; MG/1
1 TABLET, FILM COATED ORAL 2 TIMES DAILY
Qty: 20 TABLET | Refills: 0 | Status: SHIPPED | OUTPATIENT
Start: 2024-06-05 | End: 2024-06-15

## 2024-06-05 RX ORDER — METHYLPREDNISOLONE 4 MG/1
TABLET ORAL
Qty: 21 EACH | Refills: 0 | Status: SHIPPED | OUTPATIENT
Start: 2024-06-05

## 2024-06-05 RX ORDER — POLYMYXIN B SULFATE AND TRIMETHOPRIM 1; 10000 MG/ML; [USP'U]/ML
1 SOLUTION OPHTHALMIC EVERY 4 HOURS
Qty: 10 ML | Refills: 0 | Status: SHIPPED | OUTPATIENT
Start: 2024-06-05 | End: 2024-06-05

## 2024-06-05 RX ORDER — ALBUTEROL SULFATE 90 UG/1
2 AEROSOL, METERED RESPIRATORY (INHALATION) EVERY 4 HOURS PRN
Qty: 8 G | Refills: 0 | Status: SHIPPED | OUTPATIENT
Start: 2024-06-05

## 2024-06-05 NOTE — PROGRESS NOTES
CHIEF COMPLAINT:     Chief Complaint   Patient presents with    Sinusitis     Patient states since Friday she has been having sinus pressure and pain, states \"her teeth even hurt\". Also has a bad sinus headache.    Cough     Patient states she has had a non-productive cough since the weekend.         HPI:   Martha Lo is a 43 year old female who presents for evaluation of a cough, wheezing, sinus pain, sinus pressure.  Onset was  days ago. The cough sinus drainage and pressure has been getting worse. Treatments tried OTC cough medication, tylenol.  Reports minor fever, chills, sputum production, wheezing. Denies lung disease, asthma, copd, weight loss,  smoking, heart disease. Sick contacts: coworkers and kids. Recent travel: Denies Exposure to TB: Denies. Associated symptoms include: sore throat, congestion, low grade fever, cough with green colored sputum    Current Outpatient Medications   Medication Sig Dispense Refill    albuterol 108 (90 Base) MCG/ACT Inhalation Aero Soln Inhale 2 puffs into the lungs every 4 (four) hours as needed for Wheezing or Shortness of Breath (cough). 8 g 0    amoxicillin clavulanate 875-125 MG Oral Tab Take 1 tablet by mouth 2 (two) times daily for 10 days. Take with food to minimize upset stomach. 20 tablet 0    methylPREDNISolone (MEDROL) 4 MG Oral Tablet Therapy Pack As directed. 21 each 0    tamoxifen 20 MG Oral Tab Take 1 tablet (20 mg total) by mouth nightly. 90 tablet 3    Lactobacillus (PROBIOTIC ACIDOPHILUS OR) Take by mouth.      ibuprofen 200 MG Oral Tab Take 1 tablet (200 mg total) by mouth every 6 (six) hours as needed for Pain.      Ascorbic Acid (VITAMIN C OR) Take 1 tablet by mouth daily.        Past Medical History:    Anesthesia complication    HARD TO WAKE UP ONCE    Breast cancer (HCC)    1/22 - right - bilateral mastectomy    Cancer (HCC)    COVID-19    fatigue.    Visual impairment    glasses/contact      Social History:  Social History      Socioeconomic History    Marital status:     Number of children: 2   Tobacco Use    Smoking status: Former    Smokeless tobacco: Never    Tobacco comments:     Quit in college-social for 1 yr   Vaping Use    Vaping status: Never Used   Substance and Sexual Activity    Alcohol use: Yes     Alcohol/week: 6.0 standard drinks of alcohol     Types: 6 Standard drinks or equivalent per week     Comment: social    Drug use: No    Sexual activity: Yes     Partners: Male     Birth control/protection: Vasectomy   Other Topics Concern    Caffeine Concern No    Exercise No    Seat Belt No    Special Diet No    Stress Concern No    Weight Concern Yes        REVIEW OF SYSTEMS:   GENERAL: see HPI  SKIN: No rashes, or other skin lesions.   EYES: Denies blurred vision or double vision.  HENT:  See HPI  CARDIOVASCULAR: Denies palpitations  LUNGS: Per HPI.   GI: Denies N/V/C/D or abdominal pain.      EXAM:   /70 (BP Location: Left arm, Patient Position: Sitting, Cuff Size: adult)   Pulse 82   Temp 97.3 °F (36.3 °C) (Temporal)   Resp 18   Ht 5' 10\" (1.778 m)   Wt 192 lb 12.8 oz (87.5 kg)   LMP 05/05/2024 (Approximate)   SpO2 99%   BMI 27.66 kg/m²   GENERAL: well developed, well nourished,in no apparent distress  SKIN: no rashes, no suspicious lesions  EYES: Conjunctiva clear.  No scleral icterus.  HENT: Atraumatic, normocephalic.  TM's clear bilaterally.  Nostrils patent, nasal mucosa pink and non-inflamed.  No erythema of the throat.  NECK: supple, non-tender.  LUNGS: Normal respiratory rate. Normal effort.  + cough. + wheezing. No rales or crackles. No decreased BS.   CARDIO: RRR without murmur  LYMPH: No cervical or supraclavicular lymphadenopathy.   EXTREMITIES:  No clubbing, cyanosis, or edema.    ASSESSMENT AND PLAN:   Martha Lo is a 43 year old female who presents with:     ASSESSMENT:  Encounter Diagnoses   Name Primary?    Acute cough Yes    Bronchitis with acute wheezing     Acute  non-recurrent frontal sinusitis     Laboratory tests ordered as part of a complete physical exam (CPE)     Post-COVID chronic cough        PLAN:  Meds as below.  Comfort Care as listed in Patient Instructions.      Meds & Refills for this Visit:  Requested Prescriptions     Signed Prescriptions Disp Refills    albuterol 108 (90 Base) MCG/ACT Inhalation Aero Soln 8 g 0     Sig: Inhale 2 puffs into the lungs every 4 (four) hours as needed for Wheezing or Shortness of Breath (cough).    amoxicillin clavulanate 875-125 MG Oral Tab 20 tablet 0     Sig: Take 1 tablet by mouth 2 (two) times daily for 10 days. Take with food to minimize upset stomach.    methylPREDNISolone (MEDROL) 4 MG Oral Tablet Therapy Pack 21 each 0     Sig: As directed.     Side effects, risks, benefits, of medication explained and discussed.      Recommended increased fluids/humidity  12-hour decongestant nasal spray twice daily for a maximum of 4 days  Steam inhalation for sinus pressure   OTC cold and flu medication as needed.   Robitussin or Robitussin DM as needed for cough  1 tsp honey for the cough prn    Tylenol as needed/Ibuprofen as needed, do not exceed 4000 mg of acetaminophen or 2400 mg of ibuprofen    Recheck if not improved in one week or sooner if worsening.    The patient indicates understanding of these issues and agrees to the plan.  The patient is asked to see PCP if sx's persist for more than 2 weeks, sooner if worsen.

## 2024-07-23 ENCOUNTER — OFFICE VISIT (OUTPATIENT)
Dept: FAMILY MEDICINE CLINIC | Facility: CLINIC | Age: 44
End: 2024-07-23
Payer: COMMERCIAL

## 2024-07-23 ENCOUNTER — HOSPITAL ENCOUNTER (OUTPATIENT)
Dept: GENERAL RADIOLOGY | Age: 44
Discharge: HOME | End: 2024-07-23
Attending: FAMILY MEDICINE
Payer: COMMERCIAL

## 2024-07-23 ENCOUNTER — LAB ENCOUNTER (OUTPATIENT)
Dept: LAB | Age: 44
End: 2024-07-23
Attending: FAMILY MEDICINE
Payer: COMMERCIAL

## 2024-07-23 VITALS
TEMPERATURE: 97 F | OXYGEN SATURATION: 98 % | DIASTOLIC BLOOD PRESSURE: 80 MMHG | SYSTOLIC BLOOD PRESSURE: 110 MMHG | RESPIRATION RATE: 16 BRPM | BODY MASS INDEX: 27.63 KG/M2 | HEART RATE: 84 BPM | HEIGHT: 70 IN | WEIGHT: 193 LBS

## 2024-07-23 DIAGNOSIS — Z00.00 LABORATORY TESTS ORDERED AS PART OF A COMPLETE PHYSICAL EXAM (CPE): ICD-10-CM

## 2024-07-23 DIAGNOSIS — J40 BRONCHITIS WITH ACUTE WHEEZING: ICD-10-CM

## 2024-07-23 DIAGNOSIS — R05.2 SUBACUTE COUGH: ICD-10-CM

## 2024-07-23 DIAGNOSIS — J01.10 ACUTE NON-RECURRENT FRONTAL SINUSITIS: ICD-10-CM

## 2024-07-23 DIAGNOSIS — J40 BRONCHITIS WITH ACUTE WHEEZING: Primary | ICD-10-CM

## 2024-07-23 LAB
ALBUMIN SERPL-MCNC: 4.9 G/DL (ref 3.2–4.8)
ALBUMIN/GLOB SERPL: 1.7 {RATIO} (ref 1–2)
ALP LIVER SERPL-CCNC: 66 U/L
ALT SERPL-CCNC: 21 U/L
ANION GAP SERPL CALC-SCNC: 4 MMOL/L (ref 0–18)
AST SERPL-CCNC: 28 U/L (ref ?–34)
BASOPHILS # BLD AUTO: 0.07 X10(3) UL (ref 0–0.2)
BASOPHILS NFR BLD AUTO: 0.6 %
BILIRUB SERPL-MCNC: 0.4 MG/DL (ref 0.3–1.2)
BUN BLD-MCNC: 9 MG/DL (ref 9–23)
CALCIUM BLD-MCNC: 9.3 MG/DL (ref 8.7–10.4)
CHLORIDE SERPL-SCNC: 105 MMOL/L (ref 98–112)
CHOLEST SERPL-MCNC: 173 MG/DL (ref ?–200)
CO2 SERPL-SCNC: 27 MMOL/L (ref 21–32)
CREAT BLD-MCNC: 0.91 MG/DL
EGFRCR SERPLBLD CKD-EPI 2021: 80 ML/MIN/1.73M2 (ref 60–?)
EOSINOPHIL # BLD AUTO: 0.18 X10(3) UL (ref 0–0.7)
EOSINOPHIL NFR BLD AUTO: 1.5 %
ERYTHROCYTE [DISTWIDTH] IN BLOOD BY AUTOMATED COUNT: 12.7 %
EST. AVERAGE GLUCOSE BLD GHB EST-MCNC: 103 MG/DL (ref 68–126)
FASTING PATIENT LIPID ANSWER: NO
FASTING STATUS PATIENT QL REPORTED: NO
GLOBULIN PLAS-MCNC: 2.9 G/DL (ref 2.8–4.4)
GLUCOSE BLD-MCNC: 93 MG/DL (ref 70–99)
HBA1C MFR BLD: 5.2 % (ref ?–5.7)
HCT VFR BLD AUTO: 44.1 %
HDLC SERPL-MCNC: 64 MG/DL (ref 40–59)
HGB BLD-MCNC: 14.8 G/DL
IMM GRANULOCYTES # BLD AUTO: 0.05 X10(3) UL (ref 0–1)
IMM GRANULOCYTES NFR BLD: 0.4 %
LDLC SERPL CALC-MCNC: 91 MG/DL (ref ?–100)
LYMPHOCYTES # BLD AUTO: 5.53 X10(3) UL (ref 1–4)
LYMPHOCYTES NFR BLD AUTO: 47.6 %
MCH RBC QN AUTO: 31.6 PG (ref 26–34)
MCHC RBC AUTO-ENTMCNC: 33.6 G/DL (ref 31–37)
MCV RBC AUTO: 94.2 FL
MONOCYTES # BLD AUTO: 1.49 X10(3) UL (ref 0.1–1)
MONOCYTES NFR BLD AUTO: 12.8 %
NEUTROPHILS # BLD AUTO: 4.3 X10 (3) UL (ref 1.5–7.7)
NEUTROPHILS # BLD AUTO: 4.3 X10(3) UL (ref 1.5–7.7)
NEUTROPHILS NFR BLD AUTO: 37.1 %
NONHDLC SERPL-MCNC: 109 MG/DL (ref ?–130)
OSMOLALITY SERPL CALC.SUM OF ELEC: 280 MOSM/KG (ref 275–295)
PLATELET # BLD AUTO: 194 10(3)UL (ref 150–450)
POTASSIUM SERPL-SCNC: 3.8 MMOL/L (ref 3.5–5.1)
PROT SERPL-MCNC: 7.8 G/DL (ref 5.7–8.2)
RBC # BLD AUTO: 4.68 X10(6)UL
SODIUM SERPL-SCNC: 136 MMOL/L (ref 136–145)
TRIGL SERPL-MCNC: 101 MG/DL (ref 30–149)
TSI SER-ACNC: 4.07 MIU/ML (ref 0.55–4.78)
VIT D+METAB SERPL-MCNC: 22.7 NG/ML (ref 30–100)
VLDLC SERPL CALC-MCNC: 16 MG/DL (ref 0–30)
WBC # BLD AUTO: 11.6 X10(3) UL (ref 4–11)

## 2024-07-23 PROCEDURE — 85025 COMPLETE CBC W/AUTO DIFF WBC: CPT

## 2024-07-23 PROCEDURE — 80061 LIPID PANEL: CPT

## 2024-07-23 PROCEDURE — 80053 COMPREHEN METABOLIC PANEL: CPT

## 2024-07-23 PROCEDURE — 82785 ASSAY OF IGE: CPT

## 2024-07-23 PROCEDURE — 83036 HEMOGLOBIN GLYCOSYLATED A1C: CPT

## 2024-07-23 PROCEDURE — 71046 X-RAY EXAM CHEST 2 VIEWS: CPT | Performed by: FAMILY MEDICINE

## 2024-07-23 PROCEDURE — 3008F BODY MASS INDEX DOCD: CPT | Performed by: FAMILY MEDICINE

## 2024-07-23 PROCEDURE — 86003 ALLG SPEC IGE CRUDE XTRC EA: CPT

## 2024-07-23 PROCEDURE — 84443 ASSAY THYROID STIM HORMONE: CPT

## 2024-07-23 PROCEDURE — 99214 OFFICE O/P EST MOD 30 MIN: CPT | Performed by: FAMILY MEDICINE

## 2024-07-23 PROCEDURE — 3074F SYST BP LT 130 MM HG: CPT | Performed by: FAMILY MEDICINE

## 2024-07-23 PROCEDURE — 36415 COLL VENOUS BLD VENIPUNCTURE: CPT

## 2024-07-23 PROCEDURE — 3079F DIAST BP 80-89 MM HG: CPT | Performed by: FAMILY MEDICINE

## 2024-07-23 PROCEDURE — 82306 VITAMIN D 25 HYDROXY: CPT

## 2024-07-23 RX ORDER — FLUTICASONE FUROATE 50 UG/1
1 POWDER RESPIRATORY (INHALATION) DAILY
Qty: 30 EACH | Refills: 0 | Status: SHIPPED | OUTPATIENT
Start: 2024-07-23 | End: 2024-08-26

## 2024-07-23 RX ORDER — MONTELUKAST SODIUM 10 MG/1
10 TABLET ORAL NIGHTLY
Qty: 30 TABLET | Refills: 2 | Status: SHIPPED | OUTPATIENT
Start: 2024-07-23

## 2024-07-23 RX ORDER — AMOXICILLIN AND CLAVULANATE POTASSIUM 875; 125 MG/1; MG/1
1 TABLET, FILM COATED ORAL 2 TIMES DAILY
Qty: 20 TABLET | Refills: 0 | Status: SHIPPED | OUTPATIENT
Start: 2024-07-23 | End: 2024-08-02

## 2024-07-23 NOTE — PROGRESS NOTES
Subjective:   Martha Lo is a 43 year old female who presents for Sinus Problem (Cough - no phlegm - states has been having a cough for about a year > would like to get CXR - has been taking allegra with no help)     Patient states that she has been sick with sinus pain and pressure for the last few days. Symptoms came out of no where. Patient has greenish yellow mucus, with pressure in the frontal sinuses.  Patient has been taking allergy medication for the last few weeks and nothing is helping. Patient states that she has a \"smokers\" hacking cough that has been ongoing as well for several months. Patient denies any formal diagnosis of asthma or copd. Patient denies any complex history of allergies or shortness of breath due to allergies.   History/Other:    Chief Complaint Reviewed and Verified  Nursing Notes Reviewed and   Verified  Tobacco Reviewed  Allergies Reviewed  Medications Reviewed    Problem List Reviewed  Medical History Reviewed  Surgical History   Reviewed  OB Status Reviewed  Family History Reviewed  Social History   Reviewed         Tobacco:  She smoked tobacco in the past but quit greater than 12 months ago.  Social History     Tobacco Use   Smoking Status Former   Smokeless Tobacco Never   Tobacco Comments    Quit in college-social for 1 yr        Current Outpatient Medications   Medication Sig Dispense Refill    montelukast 10 MG Oral Tab Take 1 tablet (10 mg total) by mouth nightly. 30 tablet 2    amoxicillin clavulanate 875-125 MG Oral Tab Take 1 tablet by mouth 2 (two) times daily for 10 days. Take with food to minimize upset stomach. 20 tablet 0    Fluticasone Furoate (ARNUITY ELLIPTA) 50 MCG/ACT Inhalation Aerosol Powder, Breath Activated Inhale 1 puff into the lungs daily. 30 each 0    albuterol 108 (90 Base) MCG/ACT Inhalation Aero Soln Inhale 2 puffs into the lungs every 4 (four) hours as needed for Wheezing or Shortness of Breath (cough). 8 g 0    tamoxifen 20 MG  Oral Tab Take 1 tablet (20 mg total) by mouth nightly. 90 tablet 3    Lactobacillus (PROBIOTIC ACIDOPHILUS OR) Take by mouth.      ibuprofen 200 MG Oral Tab Take 1 tablet (200 mg total) by mouth every 6 (six) hours as needed for Pain.      Ascorbic Acid (VITAMIN C OR) Take 1 tablet by mouth daily.           Review of Systems:  Review of Systems   Constitutional:  Positive for fatigue. Negative for activity change, appetite change, chills, diaphoresis, fever and unexpected weight change.   HENT:  Positive for congestion, postnasal drip, rhinorrhea, sinus pressure, sinus pain and voice change. Negative for dental problem, ear pain, nosebleeds, sneezing, sore throat and tinnitus.    Eyes:  Negative for photophobia, pain, discharge, redness, itching and visual disturbance.   Respiratory:  Positive for cough and wheezing. Negative for apnea, chest tightness, shortness of breath and stridor.    Cardiovascular:  Negative for chest pain, palpitations and leg swelling.   Gastrointestinal:  Negative for abdominal distention, abdominal pain, blood in stool, constipation, diarrhea, nausea and vomiting.   Endocrine: Negative for polydipsia, polyphagia and polyuria.   Genitourinary:  Negative for difficulty urinating, dysuria, enuresis, flank pain, frequency, hematuria and urgency.   Musculoskeletal:  Negative for back pain, joint swelling, myalgias, neck pain and neck stiffness.   Skin:  Negative for color change, pallor and rash.   Neurological:  Negative for dizziness, tremors, syncope, facial asymmetry, speech difficulty, weakness, light-headedness, numbness and headaches.   Hematological:  Does not bruise/bleed easily.   Psychiatric/Behavioral:  Negative for behavioral problems, confusion, hallucinations and sleep disturbance.      Objective:   /80   Pulse 84   Temp 97.3 °F (36.3 °C) (Temporal)   Resp 16   Ht 5' 10\" (1.778 m)   Wt 193 lb (87.5 kg)   LMP 06/05/2024 (Approximate)   SpO2 98%   BMI 27.69 kg/m²   Estimated body mass index is 27.69 kg/m² as calculated from the following:    Height as of this encounter: 5' 10\" (1.778 m).    Weight as of this encounter: 193 lb (87.5 kg).  Physical Exam  Constitutional:       Appearance: She is well-developed.   HENT:      Head: Normocephalic and atraumatic.      Right Ear: A middle ear effusion is present. Tympanic membrane is erythematous and bulging.      Left Ear: A middle ear effusion is present. Tympanic membrane is erythematous and bulging.      Nose: Congestion and rhinorrhea present.      Right Sinus: Frontal sinus tenderness present.      Left Sinus: Frontal sinus tenderness present.      Mouth/Throat:      Mouth: Mucous membranes are moist.      Pharynx: Oropharynx is clear.   Eyes:      Conjunctiva/sclera: Conjunctivae normal.   Cardiovascular:      Rate and Rhythm: Normal rate and regular rhythm.      Pulses: Normal pulses.      Heart sounds: Normal heart sounds.   Pulmonary:      Effort: Pulmonary effort is normal.      Breath sounds: Wheezing present.   Abdominal:      General: Abdomen is flat.      Palpations: Abdomen is soft.   Musculoskeletal:      Cervical back: Normal range of motion.   Skin:     General: Skin is warm and dry.   Neurological:      General: No focal deficit present.      Mental Status: She is alert and oriented to person, place, and time.   Psychiatric:         Mood and Affect: Mood normal.         Behavior: Behavior normal.         Thought Content: Thought content normal.         Judgment: Judgment normal.           Assessment & Plan:   1. Bronchitis with acute wheezing (Primary)  -     Allergy Region 8; Future; Expected date: 07/23/2024  -     XR CHEST PA + LAT CHEST (CPT=71046); Future; Expected date: 07/23/2024  -     Arnuity Ellipta; Inhale 1 puff into the lungs daily.  Dispense: 30 each; Refill: 0  2. Acute non-recurrent frontal sinusitis  -     Allergy Region 8; Future; Expected date: 07/23/2024  -     XR CHEST PA + LAT CHEST  (CPT=71046); Future; Expected date: 07/23/2024  -     Amoxicillin-Pot Clavulanate; Take 1 tablet by mouth 2 (two) times daily for 10 days. Take with food to minimize upset stomach.  Dispense: 20 tablet; Refill: 0  3. Subacute cough  -     XR CHEST PA + LAT CHEST (CPT=71046); Future; Expected date: 07/23/2024  -     Montelukast Sodium; Take 1 tablet (10 mg total) by mouth nightly.  Dispense: 30 tablet; Refill: 2  -     Arnuity Ellipta; Inhale 1 puff into the lungs daily.  Dispense: 30 each; Refill: 0  Patient to attempt ICS for 2 weeks while starting the singulair.  Patient can then stop the daily ICS. IF symptoms come back or continue will have patient continue to use ICS and follow up with pulmonology.     BELA Mcdowell, 7/23/2024, 11:10 AM

## 2024-07-24 ENCOUNTER — OFFICE VISIT (OUTPATIENT)
Dept: HEMATOLOGY/ONCOLOGY | Facility: HOSPITAL | Age: 44
End: 2024-07-24
Attending: GENETIC COUNSELOR, MS
Payer: COMMERCIAL

## 2024-07-24 VITALS
WEIGHT: 195 LBS | SYSTOLIC BLOOD PRESSURE: 132 MMHG | RESPIRATION RATE: 16 BRPM | TEMPERATURE: 98 F | DIASTOLIC BLOOD PRESSURE: 87 MMHG | OXYGEN SATURATION: 97 % | HEART RATE: 76 BPM | BODY MASS INDEX: 28 KG/M2

## 2024-07-24 DIAGNOSIS — Z17.0 MALIGNANT NEOPLASM OF OVERLAPPING SITES OF RIGHT BREAST IN FEMALE, ESTROGEN RECEPTOR POSITIVE (HCC): Primary | ICD-10-CM

## 2024-07-24 DIAGNOSIS — D72.820 MONOCLONAL B-CELL LYMPHOCYTOSIS OF UNDETERMINED SIGNIFICANCE: ICD-10-CM

## 2024-07-24 DIAGNOSIS — C50.811 MALIGNANT NEOPLASM OF OVERLAPPING SITES OF RIGHT BREAST IN FEMALE, ESTROGEN RECEPTOR POSITIVE (HCC): Primary | ICD-10-CM

## 2024-07-24 PROCEDURE — 99213 OFFICE O/P EST LOW 20 MIN: CPT | Performed by: INTERNAL MEDICINE

## 2024-07-24 NOTE — PROGRESS NOTES
Cancer Center Progress Note    Problem List:      Patient Active Problem List   Diagnosis    Acute bilateral thoracic back pain    Metrorrhagia    Mild dysplasia of cervix - colpo 2019 - Pap: neg/neg 2020    Incomplete RBBB    BRCA gene mutation negative    S/P bilateral mastectomy    Moderate episode of recurrent major depressive disorder (HCC)       Interim History:    Martha Lo presents today for evaluation and management of a diagnosis of right breast cancer.    She complains of a lump over the left clavicle. She has tenderness and some redness in the region. She has no other pain. She has no fever or sweats. She has no dyspnea or cough. She had a CXR yesterday that was normal.     She  has been on tamoxifen 20 mg daily since 3/2022. .She has a good energy level and appetite. She has had hot flashes. Her menstrual periods are irregular but light. She has no other complaints.    The patient had Oncotype Dx testing with a RS 13.     She had flow cytometry on peripheral blood that shows a monotypic B cell population positive for CD20, CD22, CD19, CD5, and CD23. Negative for CD10, CD38, and FMC7.      Review of Systems:   Constitutional: Negative for anorexia, fatigue, fevers, chills, night sweats and weight loss.  Psychiatric: The patient's mood was calm and appropriate for this visit.  The pertinent positives and negatives were described. All other systems were negative.    PMH/PSH:  Past Medical History:    Anesthesia complication    HARD TO WAKE UP ONCE    Breast cancer (HCC)    1/22 - right - bilateral mastectomy    Cancer (HCC)    COVID-19    fatigue.    Visual impairment    glasses/contact       Past Surgical History:   Procedure Laterality Date    Breast biopsy      Breast surgery      biopsy    Mastectomy left  02/2022    Mastectomy right  02/2022    Other surgical history  01/2017    BBreast Augmentation    Tonsillectomy         Family History Reviewed:  Family History   Problem Relation Age  of Onset    Diabetes Father     Heart Disorder Mother     Cancer Mother     Thyroid disease Sister     Heart Disorder Sister     Cancer Paternal Grandfather     Breast Cancer Other     Heart Disorder Sister        Allergies:     No Known Allergies    Medications:   montelukast 10 MG Oral Tab Take 1 tablet (10 mg total) by mouth nightly. 30 tablet 2    amoxicillin clavulanate 875-125 MG Oral Tab Take 1 tablet by mouth 2 (two) times daily for 10 days. Take with food to minimize upset stomach. 20 tablet 0    Fluticasone Furoate (ARNUITY ELLIPTA) 50 MCG/ACT Inhalation Aerosol Powder, Breath Activated Inhale 1 puff into the lungs daily. 30 each 0    albuterol 108 (90 Base) MCG/ACT Inhalation Aero Soln Inhale 2 puffs into the lungs every 4 (four) hours as needed for Wheezing or Shortness of Breath (cough). 8 g 0    tamoxifen 20 MG Oral Tab Take 1 tablet (20 mg total) by mouth nightly. 90 tablet 3    Lactobacillus (PROBIOTIC ACIDOPHILUS OR) Take by mouth.      ibuprofen 200 MG Oral Tab Take 1 tablet (200 mg total) by mouth every 6 (six) hours as needed for Pain.      Ascorbic Acid (VITAMIN C OR) Take 1 tablet by mouth daily.         Vital Signs:      Height: --  Weight: 88.5 kg (195 lb) (07/24 1139)  BSA (Calculated - sq m): --  Pulse: 76 (07/24 1139)  BP: 132/87 (07/24 1139)  Temp: 97.6 °F (36.4 °C) (07/24 1139)  Do Not Use - Resp Rate: --  SpO2: 97 % (07/24 1139)      Performance Status:  ECOG 0: Fully active, able to carry on all pre-disease performance without restriction     Physical Examination:    Constitutional: Patient is alert and not in acute distress.  Eyes: Anicteric sclera, pink conjunctiva.  HEENT:  Oropharynx is clear. Neck is supple.  Respiratory: Clear to auscultation and percussion. No rales.  No wheezes.  Cardiovascular: Regular rate and rhythm. No murmurs.  Gastrointestinal: Soft, non tender with good bowel sounds.  Musculoskeletal: No edema. No calf tenderness.  Neurological: Grossly intact without  focal motor or sensory deficit.  Skin: No suspicious skin lesion, no rash, no ulceration.  Lymphatics: There is no palpable lymphadenopathy throughout in the cervical, supraclavicular, or axillary regions.  Psychiatric: The patient's mood is calm and appropriate for this visit.     Labs reviewed at this visit:     Lab Results   Component Value Date    WBC 11.6 (H) 07/23/2024    RBC 4.68 07/23/2024    HGB 14.8 07/23/2024    HCT 44.1 07/23/2024    MCV 94.2 07/23/2024    MCH 31.6 07/23/2024    MCHC 33.6 07/23/2024    RDW 12.7 07/23/2024    .0 07/23/2024     Lab Results   Component Value Date     07/23/2024    K 3.8 07/23/2024     07/23/2024    CO2 27.0 07/23/2024    BUN 9 07/23/2024    CREATSERUM 0.91 07/23/2024    GLU 93 07/23/2024    CA 9.3 07/23/2024    ALKPHO 66 07/23/2024    ALT 21 07/23/2024    AST 28 07/23/2024    BILT 0.4 07/23/2024    ALB 4.9 (H) 07/23/2024    TP 7.8 07/23/2024       Radiologic imaging reviewed at this visit:    CXR on 3/4/2022:  FINDINGS:     Normal heart size and pulmonary vascularity.  Clear lungs.  No pleural effusion.  Air-fluid levels within bilateral breast tissue expanders.  Bilateral anterior chest wall drains noted.  Right axillary surgical clips.      Impression   CONCLUSION:     1. No evidence of active cardiopulmonary disease.   2. Air-fluid levels within bilateral breast tissue expanders, of uncertain significance.         Assessment/Plan:     Multifocal Invasive lobular carcinoma of the right breast:  BilateralMastectomy 2/23/2022  Grade II  Size: 1.2 cm, 1.4 cm, 0.5 cm, 0.7 cm  SLN 0/1  ER/IA strongly positive  Ki-67 low  Her2 negative  Oncotype Dx RS 13        The patient has low grade, ER positive, HER2 negative breast cancer. She is premenopausal. The Oncotype Dx is less than 15.  She will continue tamoxifen 20 mg daily.     She has a small left supraclavicular node. I will start with work up by getting a CT of the Chest with contrast. I will contact her  with this result to see if further work up is necessary. She has a scheduled appointment in 10/2024. She will keep this for now.     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 Her CBC is stable with minimal increase in lymphocytes.  I will get the CT of the chest to assess for additional LAD.      Genetic testin gene panel was negative        Chadd Oliver MD

## 2024-07-24 NOTE — PROGRESS NOTES
Patient here for 4 month f/u for breast cancer. Patient currently on tamoxifen with no reported side effects. Patient states she's had an on going cough that started 8 months ago, patient completed chest xray yesterday. Patient noticed a left clavicle cyst about a week ago. Patient could not get into dermatology until late August. Patient states the area is tender.      Education Record    Learner:  Patient    Disease / Diagnosis: history of breast cancer     Barriers / Limitations:  None   Comments:    Method:  Discussion   Comments:    General Topics:  Medication, Side effects and symptom management, and Plan of care reviewed   Comments:    Outcome:  Shows understanding   Comments:

## 2024-07-26 LAB

## 2024-07-30 ENCOUNTER — TELEMEDICINE (OUTPATIENT)
Dept: FAMILY MEDICINE CLINIC | Facility: CLINIC | Age: 44
End: 2024-07-30
Payer: COMMERCIAL

## 2024-07-30 DIAGNOSIS — U09.9 POST-COVID CHRONIC COUGH: ICD-10-CM

## 2024-07-30 DIAGNOSIS — R05.3 POST-COVID CHRONIC COUGH: ICD-10-CM

## 2024-07-30 DIAGNOSIS — E55.9 VITAMIN D DEFICIENCY: Primary | ICD-10-CM

## 2024-07-30 PROCEDURE — 99214 OFFICE O/P EST MOD 30 MIN: CPT | Performed by: FAMILY MEDICINE

## 2024-07-30 RX ORDER — ERGOCALCIFEROL 1.25 MG/1
50000 CAPSULE ORAL WEEKLY
Qty: 12 CAPSULE | Refills: 0 | Status: SHIPPED | OUTPATIENT
Start: 2024-07-30 | End: 2024-10-28

## 2024-07-30 NOTE — PROGRESS NOTES
This visit is conducted using Telemedicine with live, interactive video and audio.    Patient has been referred to the [facility] website at [email or web address] to review the yearly Consent to Treat document.    Patient understands and accepts financial responsibility for any deductible, co-insurance and/or co-pays associated with this service.    I conducted a telehealth visit with Martha Lo today, 07/30/24, which was completed using two-way, real-time interactive audio and video communication.  CHIEF COMPLAINT:   Cough and lab values.   HPI:   Martha Lo is a 43 year old female who presents for a video visit.  Patient reports improvement in cough and cold type symptoms. Reviewed patients lab results with her.  Patient states that she symptoms of fatigue are still present. Patient slept through the night last night without waking up due to coughing. Patient states that this is a significant improvement.     Current Outpatient Medications   Medication Sig Dispense Refill    ergocalciferol 1.25 MG (43725 UT) Oral Cap Take 1 capsule (50,000 Units total) by mouth once a week. 12 capsule 0    montelukast 10 MG Oral Tab Take 1 tablet (10 mg total) by mouth nightly. 30 tablet 2    amoxicillin clavulanate 875-125 MG Oral Tab Take 1 tablet by mouth 2 (two) times daily for 10 days. Take with food to minimize upset stomach. 20 tablet 0    Fluticasone Furoate (ARNUITY ELLIPTA) 50 MCG/ACT Inhalation Aerosol Powder, Breath Activated Inhale 1 puff into the lungs daily. 30 each 0    albuterol 108 (90 Base) MCG/ACT Inhalation Aero Soln Inhale 2 puffs into the lungs every 4 (four) hours as needed for Wheezing or Shortness of Breath (cough). 8 g 0    tamoxifen 20 MG Oral Tab Take 1 tablet (20 mg total) by mouth nightly. 90 tablet 3    Lactobacillus (PROBIOTIC ACIDOPHILUS OR) Take by mouth.      ibuprofen 200 MG Oral Tab Take 1 tablet (200 mg total) by mouth every 6 (six) hours as needed for Pain.       Ascorbic Acid (VITAMIN C OR) Take 1 tablet by mouth daily.        Past Medical History:    Anesthesia complication    HARD TO WAKE UP ONCE    Breast cancer (HCC)    1/22 - right - bilateral mastectomy    Cancer (HCC)    COVID-19    fatigue.    Visual impairment    glasses/contact      Past Surgical History:   Procedure Laterality Date    Breast biopsy      Breast surgery      biopsy    Mastectomy left  02/2022    Mastectomy right  02/2022    Other surgical history  01/2017    BBreast Augmentation    Tonsillectomy           Social History     Socioeconomic History    Marital status:     Number of children: 2   Tobacco Use    Smoking status: Former    Smokeless tobacco: Never    Tobacco comments:     Quit in [place]-social for 1 yr   Vaping Use    Vaping status: Never Used   Substance and Sexual Activity    Alcohol use: Yes     Alcohol/week: 6.0 standard drinks of alcohol     Types: 6 Standard drinks or equivalent per week     Comment: social    Drug use: No    Sexual activity: Yes     Partners: Male     Birth control/protection: Vasectomy   Other Topics Concern    Caffeine Concern No    Exercise No    Seat Belt No    Special Diet No    Stress Concern No    Weight Concern Yes         REVIEW OF SYSTEMS:   GENERAL: normal appetite  SKIN: no rashes or abnormal skin lesions  HEENT: Still having dry hacking coughing fits but less harsh and less frequent.  LUNGS: denies shortness of breath or wheezing, See HPI  CARDIOVASCULAR: denies chest pain or palpitations   GI: denies N/V/C or abdominal pain  NEURO: Denies headaches    EXAM:   General: Alert  Respiratory:   Speaking in full sentences comfortably  Normal work of breathing  No cough during visit  Head: Normocephalic  Nose: No obvious nasal discharge.  Skin: No obvious rashes or lesions from what observed.     No results found for this or any previous visit (from the past 24 hour(s)).    ASSESSMENT AND PLAN:   Martha Lo is a 43 year old female who  presents with symptoms that are consistent with    ASSESSMENT:   Encounter Diagnoses   Name Primary?    Vitamin D deficiency Yes    Post-COVID chronic cough        PLAN: Meds as below.  See patient Instructions. Continue previous medications for cough.     Meds & Refills for this Visit:  Requested Prescriptions     Signed Prescriptions Disp Refills    ergocalciferol 1.25 MG (95574 UT) Oral Cap 12 capsule 0     Sig: Take 1 capsule (50,000 Units total) by mouth once a week.       Risks, benefits, and side effects of medication explained and discussed.    If cough continues will send to pulmonary for possible asthma or allergy exacerbation.    The patient indicates understanding of these issues and agrees to the plan.  The patient is asked to return if sx's persist or worsen.    Face to face time spent on Video Visit: 10  Total Time spent on visit including reviewing history, ordering labs/medication, patient examination and education: 10    Martha Lo understands video visit evaluation is not a substitute for face-to-face examination or emergency care. Patient advised to go to ER or call 911 for worsening symptoms or acute distress.      Included in this visit, time may have been spent reviewing labs, medications, radiology tests and decision making.

## 2024-08-05 ENCOUNTER — OFFICE VISIT (OUTPATIENT)
Facility: CLINIC | Age: 44
End: 2024-08-05
Payer: COMMERCIAL

## 2024-08-05 ENCOUNTER — HOSPITAL ENCOUNTER (OUTPATIENT)
Dept: CT IMAGING | Age: 44
Discharge: HOME | End: 2024-08-05
Attending: INTERNAL MEDICINE
Payer: COMMERCIAL

## 2024-08-05 VITALS
SYSTOLIC BLOOD PRESSURE: 126 MMHG | BODY MASS INDEX: 28.06 KG/M2 | DIASTOLIC BLOOD PRESSURE: 88 MMHG | HEIGHT: 70 IN | WEIGHT: 196 LBS

## 2024-08-05 DIAGNOSIS — Z00.00 ANNUAL PHYSICAL EXAM: ICD-10-CM

## 2024-08-05 DIAGNOSIS — C50.811 MALIGNANT NEOPLASM OF OVERLAPPING SITES OF RIGHT BREAST IN FEMALE, ESTROGEN RECEPTOR POSITIVE (HCC): ICD-10-CM

## 2024-08-05 DIAGNOSIS — Z13.71 BRCA GENE MUTATION NEGATIVE: ICD-10-CM

## 2024-08-05 DIAGNOSIS — Z12.4 CERVICAL CANCER SCREENING: ICD-10-CM

## 2024-08-05 DIAGNOSIS — Z17.0 MALIGNANT NEOPLASM OF OVERLAPPING SITES OF RIGHT BREAST IN FEMALE, ESTROGEN RECEPTOR POSITIVE (HCC): ICD-10-CM

## 2024-08-05 DIAGNOSIS — N36.41 URETHRAL HYPERMOBILITY: ICD-10-CM

## 2024-08-05 DIAGNOSIS — Z12.31 ENCOUNTER FOR SCREENING MAMMOGRAM FOR BREAST CANCER: Primary | ICD-10-CM

## 2024-08-05 DIAGNOSIS — Z90.13 S/P BILATERAL MASTECTOMY: ICD-10-CM

## 2024-08-05 DIAGNOSIS — D72.820 MONOCLONAL B-CELL LYMPHOCYTOSIS OF UNDETERMINED SIGNIFICANCE: ICD-10-CM

## 2024-08-05 DIAGNOSIS — N81.2 INCOMPLETE UTEROVAGINAL PROLAPSE: ICD-10-CM

## 2024-08-05 PROCEDURE — 71260 CT THORAX DX C+: CPT | Performed by: INTERNAL MEDICINE

## 2024-08-05 PROCEDURE — 87624 HPV HI-RISK TYP POOLED RSLT: CPT | Performed by: OBSTETRICS & GYNECOLOGY

## 2024-08-05 NOTE — PROGRESS NOTES
GYN H&P     Genetic questionnaire reviewed with the patient and she will be referred for genetic counseling if the questionnaire had any positive results.    The [facility] intake form was also reviewed regarding contraception, menstrual periods, urinary health, and vaginal / sexual health    2024  1:15 PM    Chief Complaint   Patient presents with    Physical     No concerns.       HPI: Martha is a 43 year old  Patient's last menstrual period was 2024 (exact date).  (contraception: vasectomy) here for her annual gyn exam.     She has no complaints.   Menses are regular. Denies any pelvic or breast complaints.   Satisfied with current contraception (vasectomy).      She has no complaints. Menses are regular. Denies any pelvic or breast complaints. Has noticed a small cyst on her left clavicle - oncologist aware, has chest CT later today for chronic cough and evaluation of cyst.        Needs to work on her pelvic floor.   Double Mastectomy -  - invasive Lobular CA - Stage 1 C  Implant reconstruction.   BRCA negative.      On Tamoxifen - no VM symptoms. / \"minor\".  No menstrual complaints.     ROVERTO since the kids - wears a pad frequently. Understand kegels. Interested in bladder sling.      LG GURDEEP  - neg/neg pap 2021      Satisfied with current contraception.          GYN hx:   Menarche: 13  Period Cycle (Days): 26-28  Period Duration (Days): 3  Period Flow: heavy day 1  Use of Birth Control (if yes, specify type): Vasectomy  Hx Prior Abnormal Pap: Yes  Pap Date: 21  Pap Result Notes: 21 Neg Neg  2020 Neg/Neg, 2019 LSIL +HPV neg 16/18/45    (2016 NEG/NEG)  Colpo 2019 URIAH 1  Follow Up Recommendation:       Past Medical History:    Anesthesia complication    HARD TO WAKE UP ONCE    Breast cancer (HCC)     - right - bilateral mastectomy    Cancer (HCC)    COVID-19    fatigue.    Visual impairment    glasses/contact     Past Surgical History:    Procedure Laterality Date    Breast biopsy      Breast surgery      biopsy    Mastectomy left  02/2022    Mastectomy right  02/2022    Other surgical history  01/2017    BBreast Augmentation    Tonsillectomy       No Known Allergies  Current Outpatient Medications on File Prior to Visit   Medication Sig Dispense Refill    ergocalciferol 1.25 MG (87257 UT) Oral Cap Take 1 capsule (50,000 Units total) by mouth once a week. 12 capsule 0    Fluticasone Furoate (ARNUITY ELLIPTA) 50 MCG/ACT Inhalation Aerosol Powder, Breath Activated Inhale 1 puff into the lungs daily. 30 each 0    albuterol 108 (90 Base) MCG/ACT Inhalation Aero Soln Inhale 2 puffs into the lungs every 4 (four) hours as needed for Wheezing or Shortness of Breath (cough). 8 g 0    tamoxifen 20 MG Oral Tab Take 1 tablet (20 mg total) by mouth nightly. 90 tablet 3    ibuprofen 200 MG Oral Tab Take 1 tablet (200 mg total) by mouth every 6 (six) hours as needed for Pain.      montelukast 10 MG Oral Tab Take 1 tablet (10 mg total) by mouth nightly. (Patient not taking: Reported on 8/5/2024) 30 tablet 2    Lactobacillus (PROBIOTIC ACIDOPHILUS OR) Take by mouth.      Ascorbic Acid (VITAMIN C OR) Take 1 tablet by mouth daily. (Patient not taking: Reported on 8/5/2024)       No current facility-administered medications on file prior to visit.     Family History   Problem Relation Age of Onset    Diabetes Father     Heart Disorder Mother     Cancer Mother     Thyroid disease Sister     Heart Disorder Sister     Cancer Paternal Grandfather     Breast Cancer Other     Heart Disorder Sister      Social History     Socioeconomic History    Marital status:      Spouse name: Not on file    Number of children: 2    Years of education: Not on file    Highest education level: Not on file   Occupational History    Not on file   Tobacco Use    Smoking status: Former    Smokeless tobacco: Never    Tobacco comments:     Quit in college-social for 1 yr   Vaping Use     Vaping status: Never Used   Substance and Sexual Activity    Alcohol use: Yes     Alcohol/week: 6.0 standard drinks of alcohol     Types: 6 Standard drinks or equivalent per week     Comment: social    Drug use: No    Sexual activity: Yes     Partners: Male     Birth control/protection: Vasectomy   Other Topics Concern    Caffeine Concern No    Exercise No    Seat Belt No    Special Diet No    Stress Concern No    Weight Concern Yes   Social History Narrative    Not on file     Social Determinants of Health     Financial Resource Strain: Not on file   Food Insecurity: Not on file   Transportation Needs: Not on file   Physical Activity: Not on file   Stress: Not on file   Social Connections: Not on file   Housing Stability: Not on file       ROS:     Review of Systems:  General: denies fevers, chills, fatigue and malaise.   Eyes: no visual changes, denies headaches  ENT: no complaints, denies earaches, runny nose, epistaxis, throat pain or sore throat  Respiratory: denies SOB, dyspnea, cough or wheezing  Cardiovascular: denies chest pain, palpitations, exercise intolerance   GI: denies abdominal pain, diarrhea, constipation  :  denies dysuria, increased urinary frequency. Endorses stress urinary incontinence. Menses regular, no dysmenorrhea, no menorrhagia, no dyspareunia   Hematological/lymphatic: denies history of excessive bleeding or bruising, denies dizziness, lightheadedness.   Breast: denies rashes, skin changes, pain, lumps or discharge. Cyst on left clavicle.  Psychiatric: denies depression, changes in sleep patterns, anxiety  Endocrine: denies hot or cold intolerance, mood changes   Neurological: denies changes in sight, smell, hearing or taste. Denies seizures or tremors  Immunological: denies allergies, denies anaphylaxis, or swollen lymph nodes  Musculoskeletal: denies joint pain, morning stiffness, decreased range of motion         O /88   Ht 70\"   Wt 196 lb (88.9 kg)   LMP 08/01/2024 (Exact  Date)   BMI 28.12 kg/m²         Wt Readings from Last 6 Encounters:   08/05/24 196 lb (88.9 kg)   07/24/24 195 lb (88.5 kg)   07/23/24 193 lb (87.5 kg)   06/05/24 192 lb 12.8 oz (87.5 kg)   04/18/24 193 lb 9.6 oz (87.8 kg)   04/01/24 197 lb (89.4 kg)     Exam:   GENERAL: well developed, well nourished, in no apparent distress, oriented.  SKIN: no rashes, no suspicious lesions  HEENT: normal  NECK: supple; no thyromegaly, no adenopathy  LUNGS: clear to auscultation  CARDIOVASCULAR: normal S1, S2, RRR  BREASTS: bilateral mastectomy with nipples removed. no axillary adenopathy  ABDOMEN: no scars,  soft, non distended; non tender, no masses  PELVIC: External Genitalia: Normal appearing, no lesions.    Vagina: normal pink mucosa, no lesions, normal clear discharge.    Bladder well supported.  No  anterior or posterior hernias    Cervix: multiparous, no lesions , No CMT     Uterus:  mobile, non tender, normal size    Adnexa: non tender, no masses, normal size    Rectal: deferred  EXTREMITIES:  non tender without edema        A/P: Patient is 43 year old female with no complaints. Here for well woman exam.            Patient counseled on:    Diet/exercise.      Self Breast Exams     Safe sex practices / and living environment     Vaccines:  Annual Flu, Tdap +/- Gardasil not recommended (up to 45 yrs).      Pneumococcal at 65 yrs old, Shingles at 60 yrs old          Pap: done  GC/Chlamydia:  na  Mammogram:  s/p bilateral mastectomy  Dexa:  na  Colonoscopy / Cologuard:   na  Lipid / Cholesterol:    Lipid Panel [[id number]] (Abnormal)  Resulted: 07/23/24 1926, Result status: Final result  Resulting lab: [facility] ([facility])     Specimen Information    ID Type Source Collected On   [id number] Blood -- 07/23/24 0929     Components    Component Value Reference Range Flag   Cholesterol, Total 173 <200 mg/dL --   Comment: Desirable  <200 mg/dL  Borderline  200-239 mg/dL  High      >=240 mg/dL     HDL  Cholesterol 64 40 - 59 mg/dL H High    Comment:      Interpretive Information:  An HDL cholesterol <40 mg/dL is low and constitutes a coronary heart disease risk factor. An HDL cholesterol >60 mg/dL is a negative risk factor for coronary heart disease.     Triglycerides 101 30 - 149 mg/dL --   Comment:      Reference interval for fasting triglycerides  Desirable: <150 mg/dL  Borderline: 150-199 mg/dL  High: 200-499 mg/dL  Very High: >=500 mg/dL       LDL Cholesterol 91 <100 mg/dL --   Comment:      Optimal            <100 mg/dL  Near/Above OptimaL 100-129 mg/dL  Borderline High    130-159 mg/dL  High               160-189 mg/dL    Very High          >190 mg/dL     VLDL 16 0 - 30 mg/dL --   Non HDL Chol 109 <130 mg/dL --   Comment:      Desirable  <130 mg/dL  Borderline  130-159 mg/dL  High        160-189 mg/dL      Very high >=190 mg/dL            Meds This Visit:    Requested Prescriptions      No prescriptions requested or ordered in this encounter       1. Encounter for screening mammogram for breast cancer    2. Cervical cancer screening  - ThinPrep PAP Smear; Future  - Hpv High Risk , Thin Prep Collection; Future    3. Annual physical exam    4. BRCA gene mutation negative    5. S/P bilateral mastectomy    6. Incomplete uterovaginal prolapse    7. Urethral hypermobility    Doing well, no concerns. Pap collected.  Urogyne referral for stress incontinence.     Return in about 1 year (around 8/5/2025) for Well Woman Exam.    Сергей Selby MD   8/5/2024  1:15 PM       This note was created by [employer] voice recognition. Errors in content may be related to improper recognition by the system; efforts to review and correct have been done but errors may still exist. Please contact me with any questions.

## 2024-08-07 LAB — HPV E6+E7 MRNA CVX QL NAA+PROBE: NEGATIVE

## 2024-08-13 LAB
.: ABNORMAL
.: ABNORMAL

## 2024-10-10 ENCOUNTER — OFFICE VISIT (OUTPATIENT)
Dept: HEMATOLOGY/ONCOLOGY | Age: 44
End: 2024-10-10
Attending: GENETIC COUNSELOR, MS
Payer: COMMERCIAL

## 2024-10-10 VITALS
HEART RATE: 79 BPM | RESPIRATION RATE: 18 BRPM | OXYGEN SATURATION: 100 % | TEMPERATURE: 98 F | SYSTOLIC BLOOD PRESSURE: 126 MMHG | BODY MASS INDEX: 27.49 KG/M2 | DIASTOLIC BLOOD PRESSURE: 83 MMHG | WEIGHT: 192 LBS | HEIGHT: 70 IN

## 2024-10-10 DIAGNOSIS — D72.820 MONOCLONAL B-CELL LYMPHOCYTOSIS OF UNDETERMINED SIGNIFICANCE: ICD-10-CM

## 2024-10-10 DIAGNOSIS — C50.811 MALIGNANT NEOPLASM OF OVERLAPPING SITES OF RIGHT BREAST IN FEMALE, ESTROGEN RECEPTOR POSITIVE (HCC): Primary | ICD-10-CM

## 2024-10-10 DIAGNOSIS — R91.1 SOLITARY LUNG NODULE: ICD-10-CM

## 2024-10-10 DIAGNOSIS — Z17.0 MALIGNANT NEOPLASM OF OVERLAPPING SITES OF RIGHT BREAST IN FEMALE, ESTROGEN RECEPTOR POSITIVE (HCC): Primary | ICD-10-CM

## 2024-10-10 LAB
ALBUMIN SERPL-MCNC: 3.9 G/DL (ref 3.4–5)
ALBUMIN/GLOB SERPL: 1.2 {RATIO} (ref 1–2)
ALP LIVER SERPL-CCNC: 61 U/L
ALT SERPL-CCNC: 26 U/L
ANION GAP SERPL CALC-SCNC: 2 MMOL/L (ref 0–18)
AST SERPL-CCNC: 16 U/L (ref 15–37)
BASOPHILS # BLD AUTO: 0.06 X10(3) UL (ref 0–0.2)
BASOPHILS NFR BLD AUTO: 0.4 %
BILIRUB SERPL-MCNC: 0.5 MG/DL (ref 0.1–2)
BUN BLD-MCNC: 11 MG/DL (ref 9–23)
CALCIUM BLD-MCNC: 8.8 MG/DL (ref 8.5–10.1)
CHLORIDE SERPL-SCNC: 107 MMOL/L (ref 98–112)
CO2 SERPL-SCNC: 29 MMOL/L (ref 21–32)
CREAT BLD-MCNC: 0.91 MG/DL
EGFRCR SERPLBLD CKD-EPI 2021: 79 ML/MIN/1.73M2 (ref 60–?)
EOSINOPHIL # BLD AUTO: 0.16 X10(3) UL (ref 0–0.7)
EOSINOPHIL NFR BLD AUTO: 1.1 %
ERYTHROCYTE [DISTWIDTH] IN BLOOD BY AUTOMATED COUNT: 12.1 %
GLOBULIN PLAS-MCNC: 3.3 G/DL (ref 2.8–4.4)
GLUCOSE BLD-MCNC: 83 MG/DL (ref 70–99)
HCT VFR BLD AUTO: 40.3 %
HGB BLD-MCNC: 13.7 G/DL
IMM GRANULOCYTES # BLD AUTO: 0.04 X10(3) UL (ref 0–1)
IMM GRANULOCYTES NFR BLD: 0.3 %
LYMPHOCYTES # BLD AUTO: 8.2 X10(3) UL (ref 1–4)
LYMPHOCYTES NFR BLD AUTO: 57.1 %
MCH RBC QN AUTO: 32.1 PG (ref 26–34)
MCHC RBC AUTO-ENTMCNC: 34 G/DL (ref 31–37)
MCV RBC AUTO: 94.4 FL
MONOCYTES # BLD AUTO: 0.9 X10(3) UL (ref 0.1–1)
MONOCYTES NFR BLD AUTO: 6.3 %
NEUTROPHILS # BLD AUTO: 4.99 X10 (3) UL (ref 1.5–7.7)
NEUTROPHILS # BLD AUTO: 4.99 X10(3) UL (ref 1.5–7.7)
NEUTROPHILS NFR BLD AUTO: 34.8 %
OSMOLALITY SERPL CALC.SUM OF ELEC: 285 MOSM/KG (ref 275–295)
PLATELET # BLD AUTO: 224 10(3)UL (ref 150–450)
POTASSIUM SERPL-SCNC: 3.4 MMOL/L (ref 3.5–5.1)
PROT SERPL-MCNC: 7.2 G/DL (ref 6.4–8.2)
RBC # BLD AUTO: 4.27 X10(6)UL
SODIUM SERPL-SCNC: 138 MMOL/L (ref 136–145)
WBC # BLD AUTO: 14.4 X10(3) UL (ref 4–11)

## 2024-10-10 PROCEDURE — 99214 OFFICE O/P EST MOD 30 MIN: CPT | Performed by: INTERNAL MEDICINE

## 2024-10-10 NOTE — PROGRESS NOTES
Cancer Center Progress Note    Problem List:      Patient Active Problem List   Diagnosis    Acute bilateral thoracic back pain    Metrorrhagia    Mild dysplasia of cervix - colpo 2019 - Pap: neg/neg 2020    Incomplete RBBB    BRCA gene mutation negative    S/P bilateral mastectomy    Moderate episode of recurrent major depressive disorder (HCC)       Interim History:    Martha Lo presents today for evaluation and management of a diagnosis of right breast cancer.    She had a CT of the chest on 8/5/2024 that showed a 3 mm pulmonary nodule in the right upper lobe. There was no other chest finding. She had complained of a lump over the left clavicle which caused us to get this scan. This lump has resolved.    She  has been on tamoxifen 20 mg daily since 3/2022. .She has a good energy level and appetite. She has had hot flashes. Her menstrual periods are irregular but light. She has no other complaints. She has no fever or sweats. She has no dyspnea or cough.     The patient had Oncotype Dx testing with a RS 13.     She had flow cytometry on peripheral blood that shows a monotypic B cell population positive for CD20, CD22, CD19, CD5, and CD23. Negative for CD10, CD38, and FMC7.      Review of Systems:   Constitutional: Negative for anorexia, fatigue, fevers, chills, night sweats and weight loss.  Psychiatric: The patient's mood was calm and appropriate for this visit.  The pertinent positives and negatives were described. All other systems were negative.    PMH/PSH:  Past Medical History:    Anesthesia complication    HARD TO WAKE UP ONCE    Breast cancer (HCC)    1/22 - right - bilateral mastectomy    Cancer (HCC)    COVID-19    fatigue.    Visual impairment    glasses/contact       Past Surgical History:   Procedure Laterality Date    Breast biopsy      Breast surgery      biopsy    Mastectomy left  02/2022    Mastectomy right  02/2022    Other surgical history  01/2017    BBreast Augmentation     Tonsillectomy         Family History Reviewed:  Family History   Problem Relation Age of Onset    Diabetes Father     Heart Disorder Mother     Cancer Mother     Thyroid disease Sister     Heart Disorder Sister     Cancer Paternal Grandfather     Breast Cancer Other     Heart Disorder Sister        Allergies:     No Known Allergies    Medications:   ergocalciferol 1.25 MG (36058 UT) Oral Cap Take 1 capsule (50,000 Units total) by mouth once a week. 12 capsule 0    albuterol 108 (90 Base) MCG/ACT Inhalation Aero Soln Inhale 2 puffs into the lungs every 4 (four) hours as needed for Wheezing or Shortness of Breath (cough). 8 g 0    tamoxifen 20 MG Oral Tab Take 1 tablet (20 mg total) by mouth nightly. 90 tablet 3    ibuprofen 200 MG Oral Tab Take 1 tablet (200 mg total) by mouth every 6 (six) hours as needed for Pain.      Ascorbic Acid (VITAMIN C OR) Take 1 tablet by mouth daily.         Vital Signs:      Height: 177.8 cm (5' 10\") (10/10 1407)  Weight: 87.1 kg (192 lb) (10/10 1407)  BSA (Calculated - sq m): 2.05 sq meters (10/10 1407)  Pulse: 79 (10/10 1407)  BP: 126/83 (10/10 1407)  Temp: 97.5 °F (36.4 °C) (10/10 1407)  Do Not Use - Resp Rate: --  SpO2: 100 % (10/10 1407)      Performance Status:  ECOG 0: Fully active, able to carry on all pre-disease performance without restriction     Physical Examination:    Constitutional: Patient is alert and not in acute distress.  Eyes: Anicteric sclera, pink conjunctiva.  HEENT:  Oropharynx is clear. Neck is supple.  Respiratory: Clear to auscultation and percussion. No rales.  No wheezes.  Cardiovascular: Regular rate and rhythm. No murmurs.  Gastrointestinal: Soft, non tender with good bowel sounds.  Musculoskeletal: No edema. No calf tenderness.  Neurological: Grossly intact without focal motor or sensory deficit.  Skin: No suspicious skin lesion, no rash, no ulceration.  Lymphatics: There is no palpable lymphadenopathy throughout in the cervical, supraclavicular, or  axillary regions.  Psychiatric: The patient's mood is calm and appropriate for this visit.     Labs reviewed at this visit:     Lab Results   Component Value Date    WBC 14.4 (H) 10/10/2024    RBC 4.27 10/10/2024    HGB 13.7 10/10/2024    HCT 40.3 10/10/2024    MCV 94.4 10/10/2024    MCH 32.1 10/10/2024    MCHC 34.0 10/10/2024    RDW 12.1 10/10/2024    .0 10/10/2024     Lab Results   Component Value Date     10/10/2024    K 3.4 (L) 10/10/2024     10/10/2024    CO2 29.0 10/10/2024    BUN 11 10/10/2024    CREATSERUM 0.91 10/10/2024    GLU 83 10/10/2024    CA 8.8 10/10/2024    ALKPHO 61 10/10/2024    ALT 26 10/10/2024    AST 16 10/10/2024    BILT 0.5 10/10/2024    ALB 3.9 10/10/2024    TP 7.2 10/10/2024       Radiologic imaging reviewed at this visit:    CXR on 3/4/2022:  FINDINGS:     Normal heart size and pulmonary vascularity.  Clear lungs.  No pleural effusion.  Air-fluid levels within bilateral breast tissue expanders.  Bilateral anterior chest wall drains noted.  Right axillary surgical clips.      Impression   CONCLUSION:     1. No evidence of active cardiopulmonary disease.   2. Air-fluid levels within bilateral breast tissue expanders, of uncertain significance.         Assessment/Plan:     Multifocal Invasive lobular carcinoma of the right breast:  BilateralMastectomy 2/23/2022  Grade II  Size: 1.2 cm, 1.4 cm, 0.5 cm, 0.7 cm  SLN 0/1  ER/SC strongly positive  Ki-67 low  Her2 negative  Oncotype Dx RS 13        The patient has low grade, ER positive, HER2 negative breast cancer. She is premenopausal. The Oncotype Dx is less than 15.  She will continue tamoxifen 20 mg daily.     She has ADRIANA at this visit. The recent CT has a solitary lung nodule. I will get a six month follow up CT scan of the chest. She will return after the scan to see me.     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 Her CBC is stable with minimal  increase in lymphocytes.  Continue to follow at 3 to 6 month intervals.     Genetic testin gene panel was negative        Chadd Oliver MD

## 2024-10-10 NOTE — PROGRESS NOTES
Patient here for 3 month f/u for h/o breast cancer. Patient taking tamoxifen daily as directed with c/o night sweats. Patient completed ct of chest on 8/5/24.  Patient states her cough has resolved. Patient states the clavicle cyst has resolved.     Education Record    Learner:  Patient    Disease / Diagnosis: breast cancer     Barriers / Limitations:  None   Comments:    Method:  Discussion   Comments:    General Topics:  Medication, Side effects and symptom management, and Plan of care reviewed   Comments:    Outcome:  Shows understanding   Comments:

## 2024-12-03 RX ORDER — ERGOCALCIFEROL 1.25 MG/1
50000 CAPSULE, LIQUID FILLED ORAL WEEKLY
Qty: 12 CAPSULE | Refills: 0 | OUTPATIENT
Start: 2024-12-03

## 2024-12-03 NOTE — TELEPHONE ENCOUNTER
Requesting Vitamin D 50,000IU  Last OV: 7/30/24 Telemedicine  RTC: prn  Last Rx'd 7/30/24 #12 with 0 refills    Future Appointments   Date Time Provider Department Center   4/10/2025  2:30 PM Chadd Oliver MD  [facility] [place]     Last Vitamin D level checked 7/23/24    Request denied - patient completed 12 week therapy

## 2024-12-12 RX ORDER — ERGOCALCIFEROL 1.25 MG/1
50000 CAPSULE, LIQUID FILLED ORAL WEEKLY
Qty: 12 CAPSULE | Refills: 0 | OUTPATIENT
Start: 2024-12-12

## 2024-12-12 NOTE — TELEPHONE ENCOUNTER
Requesting Vitamin D 50,000IU  Last OV: 7/23/24  RTC: prn  Last Rx'd 7/30/24 #12 with 0 refills    Future Appointments   Date Time Provider Department Center   4/10/2025  2:30 PM Chadd Oliver MD PF HEM [facility] [place]       Last Vitamin D checked 7/23/24  12 week therapy completed. Request denied.

## 2025-01-21 DIAGNOSIS — Z17.0 MALIGNANT NEOPLASM OF OVERLAPPING SITES OF RIGHT BREAST IN FEMALE, ESTROGEN RECEPTOR POSITIVE (HCC): ICD-10-CM

## 2025-01-21 DIAGNOSIS — C50.811 MALIGNANT NEOPLASM OF OVERLAPPING SITES OF RIGHT BREAST IN FEMALE, ESTROGEN RECEPTOR POSITIVE (HCC): ICD-10-CM

## 2025-01-21 RX ORDER — TAMOXIFEN CITRATE 20 MG/1
20 TABLET ORAL NIGHTLY
Qty: 90 TABLET | Refills: 3 | Status: SHIPPED | OUTPATIENT
Start: 2025-01-21

## 2025-02-19 ENCOUNTER — HOSPITAL ENCOUNTER (OUTPATIENT)
Dept: CT IMAGING | Age: 45
Discharge: HOME | End: 2025-02-19
Attending: INTERNAL MEDICINE
Payer: COMMERCIAL

## 2025-02-19 DIAGNOSIS — Z17.0 MALIGNANT NEOPLASM OF OVERLAPPING SITES OF RIGHT BREAST IN FEMALE, ESTROGEN RECEPTOR POSITIVE (HCC): ICD-10-CM

## 2025-02-19 DIAGNOSIS — C50.811 MALIGNANT NEOPLASM OF OVERLAPPING SITES OF RIGHT BREAST IN FEMALE, ESTROGEN RECEPTOR POSITIVE (HCC): ICD-10-CM

## 2025-02-19 DIAGNOSIS — R91.1 SOLITARY LUNG NODULE: ICD-10-CM

## 2025-02-19 DIAGNOSIS — D72.820 MONOCLONAL B-CELL LYMPHOCYTOSIS OF UNDETERMINED SIGNIFICANCE: ICD-10-CM

## 2025-02-19 LAB
CREAT BLD-MCNC: 0.8 MG/DL
EGFRCR SERPLBLD CKD-EPI 2021: 93 ML/MIN/1.73M2 (ref 60–?)

## 2025-02-19 PROCEDURE — 82565 ASSAY OF CREATININE: CPT

## 2025-02-19 PROCEDURE — 71260 CT THORAX DX C+: CPT | Performed by: INTERNAL MEDICINE

## 2025-04-10 ENCOUNTER — OFFICE VISIT (OUTPATIENT)
Age: 45
End: 2025-04-10
Attending: GENETIC COUNSELOR, MS
Payer: COMMERCIAL

## 2025-04-10 VITALS
BODY MASS INDEX: 28.13 KG/M2 | TEMPERATURE: 97 F | OXYGEN SATURATION: 99 % | RESPIRATION RATE: 18 BRPM | WEIGHT: 196.5 LBS | HEIGHT: 70 IN

## 2025-04-10 DIAGNOSIS — D72.820 MONOCLONAL B-CELL LYMPHOCYTOSIS OF UNDETERMINED SIGNIFICANCE: ICD-10-CM

## 2025-04-10 DIAGNOSIS — R91.1 SOLITARY LUNG NODULE: ICD-10-CM

## 2025-04-10 DIAGNOSIS — Z17.0 MALIGNANT NEOPLASM OF OVERLAPPING SITES OF RIGHT BREAST IN FEMALE, ESTROGEN RECEPTOR POSITIVE (HCC): Primary | ICD-10-CM

## 2025-04-10 DIAGNOSIS — C50.811 MALIGNANT NEOPLASM OF OVERLAPPING SITES OF RIGHT BREAST IN FEMALE, ESTROGEN RECEPTOR POSITIVE (HCC): Primary | ICD-10-CM

## 2025-04-10 LAB
ALBUMIN SERPL-MCNC: 4.5 G/DL (ref 3.2–4.8)
ALBUMIN/GLOB SERPL: 1.9 {RATIO} (ref 1–2)
ALP LIVER SERPL-CCNC: 62 U/L (ref 37–98)
ALT SERPL-CCNC: 18 U/L (ref 10–49)
ANION GAP SERPL CALC-SCNC: 7 MMOL/L (ref 0–18)
AST SERPL-CCNC: 20 U/L (ref ?–34)
BASOPHILS # BLD AUTO: 0.06 X10(3) UL (ref 0–0.2)
BASOPHILS NFR BLD AUTO: 0.5 %
BILIRUB SERPL-MCNC: 0.4 MG/DL (ref 0.3–1.2)
BUN BLD-MCNC: 14 MG/DL (ref 9–23)
CALCIUM BLD-MCNC: 9 MG/DL (ref 8.7–10.6)
CHLORIDE SERPL-SCNC: 107 MMOL/L (ref 98–112)
CO2 SERPL-SCNC: 26 MMOL/L (ref 21–32)
CREAT BLD-MCNC: 0.72 MG/DL (ref 0.55–1.02)
EGFRCR SERPLBLD CKD-EPI 2021: 105 ML/MIN/1.73M2 (ref 60–?)
EOSINOPHIL # BLD AUTO: 0.17 X10(3) UL (ref 0–0.7)
EOSINOPHIL NFR BLD AUTO: 1.3 %
ERYTHROCYTE [DISTWIDTH] IN BLOOD BY AUTOMATED COUNT: 12 %
FASTING STATUS PATIENT QL REPORTED: NO
GLOBULIN PLAS-MCNC: 2.4 G/DL (ref 2–3.5)
GLUCOSE BLD-MCNC: 101 MG/DL (ref 70–99)
HCT VFR BLD AUTO: 37.4 % (ref 35–48)
HGB BLD-MCNC: 12.7 G/DL (ref 12–16)
IMM GRANULOCYTES # BLD AUTO: 0.03 X10(3) UL (ref 0–1)
IMM GRANULOCYTES NFR BLD: 0.2 %
LDH SERPL L TO P-CCNC: 170 U/L (ref 120–246)
LYMPHOCYTES # BLD AUTO: 5.7 X10(3) UL (ref 1–4)
LYMPHOCYTES NFR BLD AUTO: 44.7 %
MCH RBC QN AUTO: 32.2 PG (ref 26–34)
MCHC RBC AUTO-ENTMCNC: 34 G/DL (ref 31–37)
MCV RBC AUTO: 94.7 FL (ref 80–100)
MONOCYTES # BLD AUTO: 0.83 X10(3) UL (ref 0.1–1)
MONOCYTES NFR BLD AUTO: 6.5 %
NEUTROPHILS # BLD AUTO: 5.95 X10 (3) UL (ref 1.5–7.7)
NEUTROPHILS # BLD AUTO: 5.95 X10(3) UL (ref 1.5–7.7)
NEUTROPHILS NFR BLD AUTO: 46.8 %
OSMOLALITY SERPL CALC.SUM OF ELEC: 291 MOSM/KG (ref 275–295)
PLATELET # BLD AUTO: 226 10(3)UL (ref 150–450)
POTASSIUM SERPL-SCNC: 3.8 MMOL/L (ref 3.5–5.1)
PROT SERPL-MCNC: 6.9 G/DL (ref 5.7–8.2)
RBC # BLD AUTO: 3.95 X10(6)UL (ref 3.8–5.3)
SODIUM SERPL-SCNC: 140 MMOL/L (ref 136–145)
WBC # BLD AUTO: 12.7 X10(3) UL (ref 4–11)

## 2025-04-10 NOTE — PROGRESS NOTES
Patient here for 6 month f/u for breast cancer. Patient taking tamoxifen as directed with no c/o side effects. Patient completed CT of chest on 2/19/25. Patient will have exam and declined chaperone.     Education Record    Learner:  Patient    Disease / Diagnosis: breast cancer     Barriers / Limitations:  None   Comments:    Method:  Discussion   Comments:    General Topics:  Medication, Side effects and symptom management, and Plan of care reviewed   Comments:    Outcome:  Shows understanding   Comments:

## 2025-04-10 NOTE — PROGRESS NOTES
Cancer Center Progress Note    Problem List:      Problem List[1]      History of Present Illness  Martha Lo is a 44 year old female with right breast cancer who presents for continued follow-up and management.    She has a history of right breast cancer and underwent a bilateral mastectomy on February 23, 2022, for grade two multifocal disease with at least four areas measuring 1.2 cm, 1.4 cm, 0.5 cm, and 0.7 cm. One sentinel lymph node was negative. The cancer was estrogen and progesterone receptor positive, and HER2 negative, with an oncotype DXRS of thirteen. She was started on tamoxifen and continues to take it without new issues, although she experiences hot flashes, primarily at night, and continues to have menstrual periods. No new pain or breathing problems are reported.    She has a history of monoclonal B lymphocytosis of undetermined significance. Recent blood work shows a white count of 12.7, which is lower than previous counts. Her white count has fluctuated between 10 and 14 over time, with no clear upward trend. There is a family history of chronic lymphocytic leukemia (CLL) in her mother.    A CT scan on February 19, 2025, showed stable pulmonary nodules: a 3 mm nodule in the left upper lobe and a 3 mm nodule in the right middle lobe on the fissure. These nodules were also noted in a previous scan from August 2024 and have remained stable.    She reports new symptoms of numbness and tingling in her right arm, which she attributes to her sitting position at work, and persistent lower back pain. She also mentions a history of a cough that led to the initial CT scan in August 2024.      Review of Systems:   Constitutional: Negative for anorexia, fatigue, fevers, chills, night sweats and weight loss.  Eyes: Negative for visual disturbance, irritation and redness.  Respiratory: Negative for cough, hemoptysis, chest pain, or dyspnea.  Cardiovascular: Negative for angina, orthopnea or  palpitations.  Gastrointestinal: Negative for nausea, vomiting, change in bowel habits, diarrhea, constipation and abdominal pain.  Integument/breast: Negative for rash, skin lesions, and pruritus.  Hematologic/lymphatic: Negative for easy bruising, bleeding, and lymphadenopathy.  Genitourinary: Negative for dysuria or hematuria  Psychiatric: The patient's mood was calm and appropriate for this visit.  The pertinent positives and negatives were described. All other systems were negative.    PMH/PSH:  Past Medical History[2]    Past Surgical History[3]    Family History Reviewed:  Family History[4]    Allergies:     Allergies[5]    Medications:  Medications Ordered Today[6]      Vital Signs:      Height: 177.8 cm (5' 10\") (04/10 1429)  Weight: 89.1 kg (196 lb 8 oz) (04/10 1429)  BSA (Calculated - sq m): 2.07 sq meters (04/10 1429)  Pulse: --  BP: --  Temp: 96.8 °F (36 °C) (04/10 1429)  Do Not Use - Resp Rate: --  SpO2: 99 % (04/10 1429)      Performance Status:  ECOG 0: Fully active, able to carry on all pre-disease performance without restriction     Physical Examination:      Constitutional: Patient is alert and oriented x 3, not in acute distress.  Eyes: Anicteric sclera, pink conjunctiva.  HEENT:  Oropharynx is clear. Neck is supple.  Respiratory: Clear to auscultation and percussion. No rales.  No wheezes.  Cardiovascular: Regular rate and rhythm. No murmurs.  Chest: No mass or skin lesion.  Gastrointestinal: Soft, non tender with good bowel sounds.  Musculoskeletal: No edema. No calf tenderness. Right shoulder with mild decreased range of motion  Skin: No suspicious skin lesion, no rash, no ulceration.  Lymphatics: There is no palpable lymphadenopathy throughout in the cervical, supraclavicular, or axillary regions.  Psychiatric: The patient's mood is calm and appropriate for this visit.         Results reviewed at this visit:     Lab Results   Component Value Date    WBC 12.7 (H) 04/10/2025    RBC 3.95  04/10/2025    HGB 12.7 04/10/2025    HCT 37.4 04/10/2025    MCV 94.7 04/10/2025    MCH 32.2 04/10/2025    MCHC 34.0 04/10/2025    RDW 12.0 04/10/2025    .0 04/10/2025     Lab Results   Component Value Date     04/10/2025    K 3.8 04/10/2025     04/10/2025    CO2 26.0 04/10/2025    BUN 14 04/10/2025    CREATSERUM 0.72 04/10/2025     (H) 04/10/2025    CA 9.0 04/10/2025    ALKPHO 62 04/10/2025    ALT 18 04/10/2025    AST 20 04/10/2025    BILT 0.4 04/10/2025    ALB 4.5 04/10/2025    TP 6.9 04/10/2025       Results  LABS  CBC: WBC 12.7 (04/10/2025)  CBC: WBC 14.4 (10/10/2024)    RADIOLOGY  CT scan: 3 mm left upper lobe nodule stable, 3 mm right middle lobe nodule on the fissure stable (02/19/2025)  CT scan: 3 mm right upper lobe nodule (08/2024)    PATHOLOGY  Oncotype DX RS: 13 (02/23/2022)  ER/NH receptor: strongly positive (02/23/2022)  HER2: negative (02/23/2022)  [place] lymph node biopsy: negative (02/23/2022)         Assessment & Plan  Multifocal Invasive lobular carcinoma of the right breast:  BilateralMastectomy 2/23/2022  Grade II  Size: 1.2 cm, 1.4 cm, 0.5 cm, 0.7 cm  SLN 0/1  ER/NH strongly positive  Ki-67 low  Her2 negative  Oncotype Dx RS 13    Grade 2 right breast cancer with multifocal disease, status post bilateral mastectomy on February 23, 2022. Estrogen and progesterone receptor positive, HER2 negative, with an oncotype DXRS of 13. She is on tamoxifen for three years, tolerating well, with no evidence of recurrence. Risk of local recurrence is low, approximately 1%.  - Continue tamoxifen 20 mg orally nightly  - Reassess the need for extended therapy after five years of tamoxifen treatment    Monoclonal B lymphocytosis of undetermined significance  Monoclonal B lymphocytosis with a family history of CLL in her mother. White blood cell count fluctuates between 10 to 14, currently at 12.7, with no trend indicating progression to CLL.  - Continue routine monitoring of white blood  cell count    Pulmonary nodules  Pulmonary nodules, 3 mm in the left upper lobe and 3 mm in the right middle lobe on the fissure, unchanged since CT scan in August 2024. No indication of malignancy.  - Repeat CT scan in one year to confirm stability of pulmonary nodules    Frozen shoulder  Numbness and tingling in right arm, possibly related to sitting position at work or post-surgical changes. Symptoms suggestive of frozen shoulder, with tightness and pain. Lymph nodes removed from the right side during breast cancer surgery.  - Focus on range of motion exercises for the right shoulder  - Consider lightweight muscle toning exercises for the right arm    Chronic back pain  Chronic lower back pain, monitoring symptoms for persistence or worsening.  - Consider further evaluation if pain persists or worsens           The following individual(s) verbally consented to be recorded using ambient AI listening technology and understand that they can each withdraw their consent to this listening technology at any point by asking the clinician to turn off or pause the recording:    Patient name: Martha Lo      Chadd Oliver MD          [1]   Patient Active Problem List  Diagnosis    Acute bilateral thoracic back pain    Metrorrhagia    Mild dysplasia of cervix - colpo 2019 - Pap: neg/neg 2020    Incomplete RBBB    BRCA gene mutation negative    S/P bilateral mastectomy    Moderate episode of recurrent major depressive disorder (HCC)   [2]   Past Medical History:   Anesthesia complication    HARD TO WAKE UP ONCE    Breast cancer (HCC)    1/22 - right - bilateral mastectomy    Cancer (HCC)    COVID-19    fatigue.    Visual impairment    glasses/contact   [3]   Past Surgical History:  Procedure Laterality Date    Breast biopsy      Breast surgery      biopsy    Mastectomy left  02/2022    Mastectomy right  02/2022    Other surgical history  01/2017    BBreast Augmentation    Tonsillectomy     [4]   Family  History  Problem Relation Age of Onset    Diabetes Father     Heart Disorder Mother     Cancer Mother     Thyroid disease Sister     Heart Disorder Sister     Cancer Paternal Grandfather     Breast Cancer Other     Heart Disorder Sister    [5] No Known Allergies  [6]    TAMOXIFEN 20 MG Oral Tab TAKE 1 TABLET NIGHTLY 90 tablet 3    albuterol 108 (90 Base) MCG/ACT Inhalation Aero Soln Inhale 2 puffs into the lungs every 4 (four) hours as needed for Wheezing or Shortness of Breath (cough). 8 g 0    ibuprofen 200 MG Oral Tab Take 1 tablet (200 mg total) by mouth every 6 (six) hours as needed for Pain.      Ascorbic Acid (VITAMIN C OR) Take 1 tablet by mouth in the morning.

## (undated) DIAGNOSIS — D72.820 LYMPHOCYTOSIS: Primary | ICD-10-CM

## (undated) DIAGNOSIS — C50.911 MALIGNANT NEOPLASM OF RIGHT FEMALE BREAST, UNSPECIFIED ESTROGEN RECEPTOR STATUS, UNSPECIFIED SITE OF BREAST (HCC): Primary | ICD-10-CM

## (undated) DEVICE — MEDI-VAC SUCTION HANDLE REGULAR CAPACITY: Brand: CARDINAL HEALTH

## (undated) DEVICE — SUTURE SILK 3-0 SH

## (undated) DEVICE — DRAPE,TOWEL,LARGE,INVISISHIELD: Brand: MEDLINE

## (undated) DEVICE — ABDOMINAL BINDER: Brand: DEROYAL

## (undated) DEVICE — ELECTRODE ESURG 2.75IN EZ CLN

## (undated) DEVICE — SPECIMEN CONTAINER,POSITIVE SEAL INDICATOR, OR PACKAGED: Brand: PRECISION

## (undated) DEVICE — BINDER ABD UNISX 9IN 45IN SM AND M UNIV

## (undated) DEVICE — SUT VICRYL 3-0 SH J416H

## (undated) DEVICE — PROXIMATE RH ROTATING HEAD SKIN STAPLERS (35 WIDE) CONTAINS 35 STAINLESS STEEL STAPLES: Brand: PROXIMATE

## (undated) DEVICE — SYRINGE MED 50ML LL TIP DISP

## (undated) DEVICE — 60 ML SYRINGE,TOOMEY TYPE: Brand: MONOJECT

## (undated) DEVICE — SCRUB PVP -1 PREP SOLUTION 4OZ

## (undated) DEVICE — BRA SURG ELIZ PINK L

## (undated) DEVICE — SPONGE: SPECIALTY PEANUT XR 100/CS: Brand: MEDICAL ACTION INDUSTRIES

## (undated) DEVICE — 1010 S-DRAPE TOWEL DRAPE 10/BX: Brand: STERI-DRAPE™

## (undated) DEVICE — STERILE POLYISOPRENE POWDER-FREE SURGICAL GLOVES: Brand: PROTEXIS

## (undated) DEVICE — SUTURE CHROMIC GUT 3-0 SH

## (undated) DEVICE — DRESSING BIOPATCH 1X4 CNTR

## (undated) DEVICE — TOWEL SURG OR 17X30IN BLUE

## (undated) DEVICE — DRAPE,TAPE STRIPS,STERILE: Brand: MEDLINE

## (undated) DEVICE — MAJOR GENERAL: Brand: MEDLINE INDUSTRIES, INC.

## (undated) DEVICE — SIZER BREAST IMPLANT FP 605CC

## (undated) DEVICE — ADHESIVE SKIN TOP FOR WND CLSR DERMBND ADV

## (undated) DEVICE — 3M™ STERI-STRIP™ REINFORCED ADHESIVE SKIN CLOSURES, R1548, 1 IN X 5 IN (25 MM X 125 MM), 4 STRIPS/ENVELOPE: Brand: 3M™ STERI-STRIP™

## (undated) DEVICE — LAPAROTOMY SPONGE - RF AND X-RAY DETECTABLE PRE-WASHED: Brand: SITUATE

## (undated) DEVICE — STERILE SYNTHETIC POLYISOPRENE POWDER-FREE SURGICAL GLOVES WITH HYDROGEL COATING: Brand: PROTEXIS

## (undated) DEVICE — ASPIRATION TUBING SET, DISPOSABLE: Brand: MICROAIRE®

## (undated) DEVICE — LIGHT HANDLE

## (undated) DEVICE — PAD DSG 8X12IN THK0.5IN FOAM SIL BK ADH

## (undated) DEVICE — BETADINE SOL 32 OZ 10% POVIDON

## (undated) DEVICE — MEGADYNE E-Z CLEAN BLADE 2.75"

## (undated) DEVICE — SUT PDS II 4-0 PS-2 Z496G

## (undated) DEVICE — SYSTEM ADIPOSE PROC AUTOLGS ADV INCL CANSTR

## (undated) DEVICE — BLADE 24 SS SRG STRL

## (undated) DEVICE — SUTURE MONOCRYL 4-0 PS-2

## (undated) DEVICE — MARKER SKIN PREP RESIST STRL

## (undated) DEVICE — BRA SURGICAL ELIZABETH PINK L

## (undated) DEVICE — LIGACLIP MCA MULTIPLE CLIP APPLIERS, 30 MEDIUM CLIPS: Brand: LIGACLIP

## (undated) DEVICE — SUT VICRYL 0 CT-1 J340H

## (undated) DEVICE — SYRINGE 50ML LL TIP

## (undated) DEVICE — GLOVE SUR 6.5 SENSICARE PI PIP CRM PWD F

## (undated) DEVICE — CG INFILTRATION TUBING: Brand: CG INFILTRATION TUBING

## (undated) DEVICE — 3M™ IOBAN™ 2 ANTIMICROBIAL INCISE DRAPE 6648EZ: Brand: IOBAN™ 2

## (undated) DEVICE — SYRINGE,TOOMEY,IRRIGATION,70CC,STERILE: Brand: MEDLINE

## (undated) DEVICE — STERILE LATEX POWDER-FREE SURGICAL GLOVES WITH HYDROGEL COATING, SMOOTH FINISH, STRAIGHT FINGER: Brand: PROTEXIS

## (undated) DEVICE — DYE LYMPHAZURIN ISOSULFAN BLUE

## (undated) DEVICE — NON-ADHERENT PAD PREPACK: Brand: TELFA

## (undated) DEVICE — GAMMEX® PI HYBRID SIZE 7.5, STERILE POWDER-FREE SURGICAL GLOVE, POLYISOPRENE AND NEOPRENE BLEND: Brand: GAMMEX

## (undated) DEVICE — DRAIN RELIAVAC 100CC

## (undated) DEVICE — SYRINGE MED 5ML STD CLR PLAS LL TIP N CTRL

## (undated) DEVICE — LAPAROTOMY CDS: Brand: MEDLINE INDUSTRIES, INC.

## (undated) DEVICE — BANDAGE,GAUZE,BULKEE II,4.5"X4.1YD,STRL: Brand: MEDLINE

## (undated) DEVICE — SOLUTION IRRIG 1000ML 0.9% NACL USP BTL

## (undated) DEVICE — SUTURE PROLENE 2-0 SH

## (undated) DEVICE — DRAPE HALF 40X58 DYNJP2410

## (undated) DEVICE — ELECTRODE ES 2.75IN PTFE BLDE MOD E-Z CLN

## (undated) DEVICE — BLADE ELECTROSURG 4IN INSULATE

## (undated) DEVICE — SUTURE VICRYL 3-0 SH

## (undated) DEVICE — 3M™ BAIR HUGGER® UNDERBODY BLANKET, FULL ACCESS, 10 PER CASE 63500: Brand: BAIR HUGGER™

## (undated) DEVICE — SOL  .9 1000ML BTL

## (undated) DEVICE — DRAPE EQUIPMENT INTRATEMP THER

## (undated) DEVICE — SUT VICRYL 3-0 PS-2 J497G

## (undated) DEVICE — 3M(TM) TEGADERM(TM) TRANSPARENT FILM DRESSING FRAME STYLE 9505W: Brand: 3M™ TEGADERM™

## (undated) DEVICE — SUPER SPONGES,MEDIUM: Brand: KERLIX

## (undated) DEVICE — SUT MONOCRYL 4-0 PS-2 Y496G

## (undated) DEVICE — SOL NACL IRRIG 0.9% 1000ML BTL

## (undated) DEVICE — DRESSING FOAM TOPIFOAM

## (undated) DEVICE — GLOVE SUR 7.5 SENSICARE PI PIP CRM PWD F

## (undated) DEVICE — 3M™ TEGADERM™ TRANSPARENT FILM DRESSING, 1626W, 4 IN X 4-3/4 IN (10 CM X 12 CM), 50 EACH/CARTON, 4 CARTON/CASE: Brand: 3M™ TEGADERM™

## (undated) DEVICE — SUTURE ETHIBOND EXCEL 2-0 SH

## (undated) DEVICE — DRAIN ROUND HUBLESS 15FR

## (undated) DEVICE — Device

## (undated) DEVICE — SOLUTION SURG DURA PREP HAZMAT

## (undated) DEVICE — HEMOCLIP HORIZON MED 002200

## (undated) DEVICE — SUT VICRYL 2-0 SH J417H

## (undated) DEVICE — SUTURE PDS II 2-0 CT-2

## (undated) DEVICE — PROXIMATE SKIN STAPLERS (35 WIDE) CONTAINS 35 STAINLESS STEEL STAPLES (FIXED HEAD): Brand: PROXIMATE

## (undated) DEVICE — SLEEVE COMPR MD KNEE LEN SGL USE KENDALL SCD

## (undated) DEVICE — PLASTIC BREAST CDS-LF: Brand: MEDLINE INDUSTRIES, INC.

## (undated) DEVICE — EXOFIN TISSUE ADHESIVE 1.0ML

## (undated) DEVICE — 60 ML SYRINGE LUER-LOCK TIP: Brand: MONOJECT

## (undated) DEVICE — 6 ML SYRINGE LUER-LOCK TIP: Brand: MONOJECT

## (undated) DEVICE — GOWN,SIRUS,FABRIC-REINFORCED,X-LARGE: Brand: MEDLINE

## (undated) DEVICE — UNDYED BRAIDED (POLYGLACTIN 910), SYNTHETIC ABSORBABLE SUTURE: Brand: COATED VICRYL

## (undated) DEVICE — 3M™ STERI-STRIP™ REINFORCED ADHESIVE SKIN CLOSURES, R1547, 1/2 IN X 4 IN (12 MM X 100 MM), 6 STRIPS/ENVELOPE: Brand: 3M™ STERI-STRIP™

## (undated) DEVICE — SUCTION CANISTER, 3000CC,SAFELINER: Brand: DEROYAL

## (undated) DEVICE — APPLICATOR CHLORAPREP 26ML

## (undated) DEVICE — FLEXIBLE YANKAUER,MEDIUM TIP, NO VACUUM CONTROL: Brand: ARGYLE

## (undated) DEVICE — DEVICE FAT TISSUE COLL REVOLVE

## (undated) DEVICE — CLOSURE EXOFIN 1.0ML

## (undated) DEVICE — PROVE COVER: Brand: UNBRANDED

## (undated) DEVICE — VIOLET BRAIDED (POLYGLACTIN 910), SYNTHETIC ABSORBABLE SUTURE: Brand: COATED VICRYL

## (undated) DEVICE — SUTURE ETHILON 3-0 FS-1

## (undated) DEVICE — GAUZE,SPONGE,FLUFF,6"X6.75",STRL,5/TRAY: Brand: MEDLINE

## (undated) DEVICE — SCD SLEEVE KNEE HI BLEND

## (undated) DEVICE — GOWN SURG AERO BLUE PERF LG

## (undated) DEVICE — 12 ML SYRINGE LUER-LOCK TIP: Brand: MONOJECT

## (undated) DEVICE — 40580 - THE PINK PAD - ADVANCED TRENDELENBURG POSITIONING KIT: Brand: 40580 - THE PINK PAD - ADVANCED TRENDELENBURG POSITIONING KIT

## (undated) DEVICE — SUT PLN GUT 5-0 18IN PC-1 ABSRB TAN YELLOWISH

## (undated) DEVICE — SUT CHROMIC GUT 5-0 P-3 687G

## (undated) DEVICE — Device: Brand: MICROAIRE®

## (undated) DEVICE — STOPCOCK IV 4 WAY BD

## (undated) DEVICE — 3M™ IOBAN™ 2 ANTIMICROBIAL INCISE DRAPE 6650EZ: Brand: IOBAN™ 2

## (undated) DEVICE — ANTIBACTERIAL UNDYED BRAIDED (POLYGLACTIN 910), SYNTHETIC ABSORBABLE SUTURE: Brand: COATED VICRYL

## (undated) DEVICE — LIGACLIP MCA MULTIPLE CLIP APPLIERS, 20 SMALL CLIPS: Brand: LIGACLIP

## (undated) DEVICE — SUTURE ETHIBOND EXCEL 3-0 SH

## (undated) NOTE — LETTER
To:  Dr Lorenz  Date:  2024  Patient Name: Martha Lo-Age / Sex: 1980-A: 43 y  female  Medical Records: [id number]   CSN: [id number]      Clearance for Surgery Requested by Surgeon    Request for:  Cardiac Clearance    Requested by Surgeon: Dr Boswell    Surgical Date: 2024    Procedure: Autologous fat grafting to the bilateral reconstructed breasts, Bilateral      Please fax the clearance note to the Pre-Admission Testing department.  Thank you.

## (undated) NOTE — LETTER
OUTSIDE TESTING RESULT REQUEST     IMPORTANT: FOR YOUR IMMEDIATE ATTENTION  Please FAX all test results listed below to: [phone]       * * * * If testing is NOT complete, arrange with patient A.S.A.P. * * * *      Patient Name: Martha Lo  Surgery Date: 2024  Medical Record: [id number]  CSN: [id number]  : 1980 - A: 43 y     Sex: female  Surgeon(s):  Ulysses Boswell MD  Procedure: Autologous fat grafting to the bilateral reconstructed breasts  Anesthesia Type: General     Surgeon: Ulysses Boswell MD     The following Testing and Time Line are REQUIRED PER ANESTHESIA     EKG READ AND SIGNED WITHIN   90 days  CBC [with Differential & Platelets] within  90 days  CMP (requires 4 hour fast) within  90 days      Thank You,   Sent by: BLOSSOM RAMIREZ

## (undated) NOTE — LETTER
To:  Dr. Lorenz  Date:  4/10/2024  Patient Name: Martha Lo-Age / Sex: 1980-A: 43 y  female  Medical Records: [id number]   CSN: [id number]      Clearance for Surgery Requested by Surgeon    Request for:  Cardiac Clearance    Requested by Surgeon: Dr. Boswell    Surgical Date: 2024    Procedure: Autologous fat grafting to the bilateral reconstructed breasts, Bilateral      Please fax the clearance note to the Pre-Admission Testing department.  Thank you.

## (undated) NOTE — LETTER
OUTSIDE TESTING RESULT REQUEST     IMPORTANT: FOR YOUR IMMEDIATE ATTENTION  Please FAX all test results listed below to: [phone]     Testing already done on or about: 22    * * * * If testing is NOT complete, arrange with patient A.S.A.P. * * * *      Patient Name: Daniel Riding  Surgery Date: 2022  CSN: [id number]  Medical Record: [id number]   : 1980 - A: 42 y      Sex: female  Surgeon(s):  MD Clari Spear MD  Procedure: Bilateral mastectomy with right sentinel lymph node biopsy, right lymphoscintigraphy, possible right axillary lymph node dissection ([facility]) Immediate bilateral breast reconstruction with placement of bilateral breast tissue expanders with acellular dermal matrix ([facility])  Anesthesia Type: General     Surgeon: Polina Chen MD     The following Testing and Time Line are REQUIRED PER ANESTHESIA     EKG READ AND SIGNED WITHIN   90 days      Thank Scotty Catherine by:Annabella Ewing  5/13/15

## (undated) NOTE — LETTER
To:  Dr Del Toro  Date:  2023  Patient Name: Martha Lo-Age / Sex: 1980-A: 43 y  female  Medical Records: [id number]   CSN: [id number]      Clearance for Surgery Requested by Surgeon    Request for:  Medical Clearance    Requested by Surgeon: Dr Boswell     Surgical Date: 2024    Procedure: Autologous fat grafting to the bilateral reconstructed breasts, Bilateral      Please fax the clearance note to the Pre-Admission Testing department.  Thank you.

## (undated) NOTE — LETTER
[facility] Pre-Admission Testing Department  Phone: [phone]  Right Fax: [phone]    ADDRESSEE INFORMATION: SENDER INFORMATION:   To:   DR. MOSQUEDA From: BLOSSOM RAMIREZ     Department: Pre-Admission Testing   Fax Number: [phone] Date: 3/19/2024     Phone Number:  Phone Number: [phone]   Re: Patient Name: Martha Lo  CSN: [id number]  Medical Record: [id number]   : 1980 - A: 43 y    Sex: female Fax Number: [phone]     Number of Pages (Including Cover Sheet) 1       Clearance for Surgery Requested by Surgeon      Request for:  Medical Clearance    Requested by Surgeon: DR. NIÑO    Surgical Date: 2024    Procedure: Autologous fat grafting to the bilateral reconstructed breasts, Bilateral        Please fax the clearance note to the Pre-Admission Testing department [phone].    Thank you.            This message is intended only for the use of the individual or entity to which it is addressed. It may have been disclosed to you from records whose confidentiality is protected by Federal law and applicable state law. Federal Regulation, 45 C. F. R., part 164, prohibits you from making any further disclosure without specific authorization of the person to whom it pertains, or as otherwise permitted by such regulations. If the reader of this message is not the intended recipient, you are hereby notified that reading, disseminating, distributing or copying this communication is strictly prohibited. If you have received this communication in error, please immediately notify us by telephone and return the original message to us at [address] [place] [phone] via the [place] Postal [employer].

## (undated) NOTE — ED AVS SNAPSHOT
Carol England   MRN: [id number]    Department:  [facility] Emergency Department in [place]   Date of Visit:  12/1/2019           Disclosure     Insurance plans vary and the physician(s) referred by the ER may not be covered by your plan.  Please co tell this physician (or your personal doctor if your instructions are to return to your personal doctor) about any new or lasting problems. The primary care or specialist physician will see patients referred from the [facility] Emergency Department.  David Roldan